# Patient Record
Sex: FEMALE | Race: WHITE | NOT HISPANIC OR LATINO | Employment: FULL TIME | URBAN - METROPOLITAN AREA
[De-identification: names, ages, dates, MRNs, and addresses within clinical notes are randomized per-mention and may not be internally consistent; named-entity substitution may affect disease eponyms.]

---

## 2017-07-01 ENCOUNTER — TRANSFERRED RECORDS (OUTPATIENT)
Dept: HEALTH INFORMATION MANAGEMENT | Facility: CLINIC | Age: 47
End: 2017-07-01

## 2017-07-28 ENCOUNTER — OFFICE VISIT (OUTPATIENT)
Dept: FAMILY MEDICINE | Facility: CLINIC | Age: 47
End: 2017-07-28
Payer: COMMERCIAL

## 2017-07-28 VITALS
DIASTOLIC BLOOD PRESSURE: 70 MMHG | OXYGEN SATURATION: 100 % | HEIGHT: 68 IN | SYSTOLIC BLOOD PRESSURE: 108 MMHG | HEART RATE: 80 BPM | BODY MASS INDEX: 21.22 KG/M2 | WEIGHT: 140 LBS

## 2017-07-28 DIAGNOSIS — R10.11 ABDOMINAL PAIN, RIGHT UPPER QUADRANT: Primary | ICD-10-CM

## 2017-07-28 DIAGNOSIS — R31.29 MICROSCOPIC HEMATURIA: ICD-10-CM

## 2017-07-28 LAB
ALBUMIN UR-MCNC: NEGATIVE MG/DL
AMORPH CRY #/AREA URNS HPF: ABNORMAL /HPF
APPEARANCE UR: CLEAR
BASOPHILS # BLD AUTO: 0 10E9/L (ref 0–0.2)
BASOPHILS NFR BLD AUTO: 0.3 %
BILIRUB UR QL STRIP: NEGATIVE
COLOR UR AUTO: YELLOW
CRP SERPL-MCNC: <2.9 MG/L (ref 0–8)
DIFFERENTIAL METHOD BLD: NORMAL
EOSINOPHIL # BLD AUTO: 0 10E9/L (ref 0–0.7)
EOSINOPHIL NFR BLD AUTO: 0.4 %
ERYTHROCYTE [DISTWIDTH] IN BLOOD BY AUTOMATED COUNT: 12.6 % (ref 10–15)
ERYTHROCYTE [SEDIMENTATION RATE] IN BLOOD BY WESTERGREN METHOD: 9 MM/H (ref 0–20)
GLUCOSE UR STRIP-MCNC: NEGATIVE MG/DL
HCT VFR BLD AUTO: 39.1 % (ref 35–47)
HGB BLD-MCNC: 13.2 G/DL (ref 11.7–15.7)
HGB UR QL STRIP: ABNORMAL
KETONES UR STRIP-MCNC: ABNORMAL MG/DL
LEUKOCYTE ESTERASE UR QL STRIP: NEGATIVE
LYMPHOCYTES # BLD AUTO: 1.3 10E9/L (ref 0.8–5.3)
LYMPHOCYTES NFR BLD AUTO: 18.5 %
MCH RBC QN AUTO: 29.2 PG (ref 26.5–33)
MCHC RBC AUTO-ENTMCNC: 33.8 G/DL (ref 31.5–36.5)
MCV RBC AUTO: 87 FL (ref 78–100)
MONOCYTES # BLD AUTO: 0.5 10E9/L (ref 0–1.3)
MONOCYTES NFR BLD AUTO: 6.4 %
NEUTROPHILS # BLD AUTO: 5.2 10E9/L (ref 1.6–8.3)
NEUTROPHILS NFR BLD AUTO: 74.4 %
NITRATE UR QL: NEGATIVE
PH UR STRIP: 5.5 PH (ref 5–7)
PLATELET # BLD AUTO: 169 10E9/L (ref 150–450)
RBC # BLD AUTO: 4.52 10E12/L (ref 3.8–5.2)
RBC #/AREA URNS AUTO: ABNORMAL /HPF (ref 0–2)
SP GR UR STRIP: 1.01 (ref 1–1.03)
URN SPEC COLLECT METH UR: ABNORMAL
UROBILINOGEN UR STRIP-ACNC: 0.2 EU/DL (ref 0.2–1)
WBC # BLD AUTO: 7 10E9/L (ref 4–11)
WBC #/AREA URNS AUTO: ABNORMAL /HPF (ref 0–2)

## 2017-07-28 PROCEDURE — 85652 RBC SED RATE AUTOMATED: CPT | Performed by: NURSE PRACTITIONER

## 2017-07-28 PROCEDURE — 36415 COLL VENOUS BLD VENIPUNCTURE: CPT | Performed by: NURSE PRACTITIONER

## 2017-07-28 PROCEDURE — 99214 OFFICE O/P EST MOD 30 MIN: CPT | Performed by: NURSE PRACTITIONER

## 2017-07-28 PROCEDURE — 85025 COMPLETE CBC W/AUTO DIFF WBC: CPT | Performed by: NURSE PRACTITIONER

## 2017-07-28 PROCEDURE — 80053 COMPREHEN METABOLIC PANEL: CPT | Performed by: NURSE PRACTITIONER

## 2017-07-28 PROCEDURE — 86140 C-REACTIVE PROTEIN: CPT | Performed by: NURSE PRACTITIONER

## 2017-07-28 PROCEDURE — 81001 URINALYSIS AUTO W/SCOPE: CPT | Performed by: NURSE PRACTITIONER

## 2017-07-28 NOTE — PATIENT INSTRUCTIONS
1.  Unclear cause of pain.  Doesn't fit with gallbladder, kidney stones, stomach ulcer, acid reflux, pneumonia, strain of abdominal muscle.  Location doesn't fit with ovarian cyst    2.  Labs today for blood counts, kidney/liver function, and inflammatory markers.  Recheck urine today, if blood in it still would recommend urology follow up     3.  Try over the counter omeprazole 20mg once a day for 2 weeks, take 30min before breakfast.  If stomach inflammation or ulcer would expect improvement within 2 weeks    4.  if not improving in 2 weeks recommend ultrasound and chest xray, mychart me

## 2017-07-28 NOTE — NURSING NOTE
"Chief Complaint   Patient presents with     Abdominal Pain       Initial /70 (BP Location: Right arm, Patient Position: Chair, Cuff Size: Adult Regular)  Pulse 80  Ht 5' 7.5\" (1.715 m)  Wt 140 lb (63.5 kg)  SpO2 100%  BMI 21.6 kg/m2 Estimated body mass index is 21.6 kg/(m^2) as calculated from the following:    Height as of this encounter: 5' 7.5\" (1.715 m).    Weight as of this encounter: 140 lb (63.5 kg).  Medication Reconciliation: complete     Katie Chamberlain MA      "

## 2017-07-28 NOTE — MR AVS SNAPSHOT
After Visit Summary   7/28/2017    Palmira Caraballo    MRN: [id number]           Patient Information     Date Of Birth          1970        Visit Information        Provider Department      7/28/2017 2:00 PM Yanna Small APRN CNP [facility]        Today's Diagnoses     Microscopic hematuria    -  1    Abdominal pain, right upper quadrant          Care Instructions    1.  Unclear cause of pain.  Doesn't fit with gallbladder, kidney stones, stomach ulcer, acid reflux, pneumonia, strain of abdominal muscle.  Location doesn't fit with ovarian cyst    2.  Labs today for blood counts, kidney/liver function, and inflammatory markers.  Recheck urine today, if blood in it still would recommend urology follow up     3.  Try over the counter omeprazole 20mg once a day for 2 weeks, take 30min before breakfast.  If stomach inflammation or ulcer would expect improvement within 2 weeks    4.  if not improving in 2 weeks recommend ultrasound and chest xray, mychart me           Follow-ups after your visit        Who to contact     If you have questions or need follow up information about today's clinic visit or your schedule please contact [facility] directly at [phone].  Normal or non-critical lab and imaging results will be communicated to you by MyChart, letter or phone within 4 business days after the clinic has received the results. If you do not hear from us within 7 days, please contact the clinic through [employer] or phone. If you have a critical or abnormal lab result, we will notify you by phone as soon as possible.  Submit refill requests through [employer] or call your pharmacy and they will forward the refill request to us. Please allow 3 business days for your refill to be completed.          Additional Information About Your Visit        MyChart Information     [employer] lets you send messages to your doctor, view your test results, renew your prescriptions,  "schedule appointments and more. To sign up, go to [email or web address] . Click on \"Log in\" on the left side of the screen, which will take you to the Welcome page. Then click on \"Sign up Now\" on the right side of the page.     You will be asked to enter the access code listed below, as well as some personal information. Please follow the directions to create your username and password.     Your access code is: [email or web address]  Expires: 10/26/2017  2:22 PM     Your access code will  in 90 days. If you need help or a new code, please call your [facility] clinic or [phone].        Care EveryWhere ID     This is your Care EveryWhere ID. This could be used by other organizations to access your [facility] medical records  [id number]        Your Vitals Were     Pulse Height Pulse Oximetry BMI (Body Mass Index)          80 5' 7.5\" (1.715 m) 100% 21.6 kg/m2         Blood Pressure from Last 3 Encounters:   17 108/70   07/24/15 110/66   07/17/15 116/60    Weight from Last 3 Encounters:   17 140 lb (63.5 kg)   07/24/15 143 lb 6.4 oz (65 kg)   07/17/15 140 lb 3.2 oz (63.6 kg)              We Performed the Following     CBC with platelets and differential     Comprehensive metabolic panel     CRP, inflammation     ESR: Erythrocyte sedimentation rate     UA reflex to Microscopic and Culture        Primary Care Provider Office Phone # Fax #    Tnljz Tim Cruz [phone]       XXX RESIGNED  [address] [place] [id number]        Equal Access to Services     [facility] : Hadfawn Mcgovern, janine scruggs, sam goldberg. So [facility] [phone].    ATENCIÓN: Si habla español, tiene a marquez disposición servicios gratuitos de asistencia lingüística. Mary al [phone].    We comply with applicable federal civil rights laws and [place] laws. We do not discriminate on the basis of race, color, national origin, age, " disability sex, sexual orientation or gender identity.            Thank you!     Thank you for choosing [facility]  for your care. Our goal is always to provide you with excellent care. Hearing back from our patients is one way we can continue to improve our services. Please take a few minutes to complete the written survey that you may receive in the mail after your visit with us. Thank you!             Your Updated Medication List - Protect others around you: Learn how to safely use, store and throw away your medicines at www.disposemymeds.org.          This list is accurate as of: 7/28/17  2:22 PM.  Always use your most recent med list.                   Brand Name Dispense Instructions for use Diagnosis    MIRENA (52 MG) 20 MCG/24HR IUD   Generic drug:  levonorgestrel      1 each by Intrauterine route once

## 2017-07-28 NOTE — PROGRESS NOTES
SUBJECTIVE:                                                    Palmira Caraballo is a 47 year old female who presents to clinic today for the following health issues:    Abdominal Pain      Duration: x 1 week    Description (location/character/radiation): right sided upper abdominal pain. Feels like a running cramp- constant pain       Associated flank pain: None    Intensity:  moderate    Accompanying signs and symptoms:        Fever/Chills: no        Gas/Bloating: no        Nausea/vomitting: no        Diarrhea: no        Dysuria or Hematuria: no     History (previous similar pain/trauma/previous testing): none    Precipitating or alleviating factors:       Pain worse with eating/BM/urination: no       Pain relieved by BM: no     Therapies tried and outcome: None    LMP:  Not regular because of IUD    Would like to get urinalysis done today as well.    RUQ cramping pain persistent x 1 week.  Not worse with eating, maybe worse with laying down.  No nausea or change in appetite.  No change in bowel habits. No constipation, diarrhea, or blood in stool.  No fever, fatigue, body aches.  No dysuria.  IUD in place.  No heartburn or bloating.  No chest pain, coughing, or shortness of breath.  Deep breathing does not trigger pain.  Rating at 2-3/10.      No hx abd surgery.      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  Microscopic hematuria x 2.    Under increased stress at work.      Patient Active Problem List   Diagnosis     NO ACTIVE PROBLEMS     CARDIOVASCULAR SCREENING; LDL GOAL LESS THAN 160     Encounter for IUD insertion     Past Surgical History:   Procedure Laterality Date     NO HISTORY OF SURGERY         Social History   Substance Use Topics     Smoking status: Never Smoker     Smokeless tobacco: Never Used     Alcohol use No     Family History   Problem Relation Age of Onset     CEREBROVASCULAR DISEASE Mother      Hypertension Mother      Breast Cancer Maternal Grandmother          Current Outpatient Prescriptions  "  Medication Sig Dispense Refill     levonorgestrel (MIRENA) 20 MCG/24HR IUD 1 each by Intrauterine route once         ROS:  Const, Resp, , GI, Psych as above, otherwise negative       OBJECTIVE:                                                    /70 (BP Location: Right arm, Patient Position: Chair, Cuff Size: Adult Regular)  Pulse 80  Ht 5' 7.5\" (1.715 m)  Wt 140 lb (63.5 kg)  SpO2 100%  BMI 21.6 kg/m2   GENERAL APPEARANCE: healthy, alert and no distress  EYES: Eyes grossly normal to inspection and conjunctivae and sclerae normal  RESP: lungs clear to auscultation - no rales, rhonchi or wheezes  ABDOMEN: soft, nontender, without hepatosplenomegaly or masses and bowel sounds normal.  Neg blackwell sign.  Neg CVA tenderness.  PSYCH: mentation appears normal and affect normal/bright     Diagnostic test results:  Diagnostic Test Results:  Results for orders placed or performed in visit on 07/28/17 (from the past 24 hour(s))   ESR: Erythrocyte sedimentation rate   Result Value Ref Range    Sed Rate 9 0 - 20 mm/h   CBC with platelets and differential   Result Value Ref Range    WBC 7.0 4.0 - 11.0 10e9/L    RBC Count 4.52 3.8 - 5.2 10e12/L    Hemoglobin 13.2 11.7 - 15.7 g/dL    Hematocrit 39.1 35.0 - 47.0 %    MCV 87 78 - 100 fl    MCH 29.2 26.5 - 33.0 pg    MCHC 33.8 31.5 - 36.5 g/dL    RDW 12.6 10.0 - 15.0 %    Platelet Count 169 150 - 450 10e9/L    Diff Method Automated Method     % Neutrophils 74.4 %    % Lymphocytes 18.5 %    % Monocytes 6.4 %    % Eosinophils 0.4 %    % Basophils 0.3 %    Absolute Neutrophil 5.2 1.6 - 8.3 10e9/L    Absolute Lymphocytes 1.3 0.8 - 5.3 10e9/L    Absolute Monocytes 0.5 0.0 - 1.3 10e9/L    Absolute Eosinophils 0.0 0.0 - 0.7 10e9/L    Absolute Basophils 0.0 0.0 - 0.2 10e9/L   UA reflex to Microscopic and Culture   Result Value Ref Range    Color Urine Yellow     Appearance Urine Clear     Glucose Urine Negative NEG mg/dL    Bilirubin Urine Negative NEG    Ketones Urine Trace (A) " NEG mg/dL    Specific Gravity Urine 1.015 1.003 - 1.035    Blood Urine Small (A) NEG    pH Urine 5.5 5.0 - 7.0 pH    Protein Albumin Urine Negative NEG mg/dL    Urobilinogen Urine 0.2 0.2 - 1.0 EU/dL    Nitrite Urine Negative NEG    Leukocyte Esterase Urine Negative NEG    Source Midstream Urine    Urine Microscopic   Result Value Ref Range    WBC Urine O - 2 0 - 2 /HPF    RBC Urine 2-5 (A) 0 - 2 /HPF    Amorphous Crystals Few (A) NEG /HPF          ASSESSMENT/PLAN:                                                    (R10.11) Abdominal pain, right upper quadrant  (primary encounter diagnosis)  Comment: broad differential.  Pain pattern not consistent with bilary colic.  No colicky character to suggest renal stones.  Not positional, doesn't seem muscular.  No resp symptoms concerning for pneumonia.  Benign abd exam, not consistent with appendicitis. Location not consistent with adnexal etiology.  IUD in, pregnancy unlikely   Plan: Comprehensive metabolic panel, ESR: Erythrocyte        sedimentation rate, CRP, inflammation, CBC with        platelets and differential        Baseline labs today.  2 week trial of PPI for gastritis/PUD but discussed symptoms dont quite fit.  Reviewed differential with pt.  Recommend follow up CXR and abd US if not improving within 2 weeks.  If fever or sever pain recommend ER eval.    (R31.29) Microscopic hematuria  Comment: hx x 2 at [facility]  Plan: UA reflex to Microscopic and Culture        Ongoing today, recommend follow up with urology, pt agrees        See Patient Instructions    Yanna Small, CNP  [facility]    Patient Instructions   1.  Unclear cause of pain.  Doesn't fit with gallbladder, kidney stones, stomach ulcer, acid reflux, pneumonia, strain of abdominal muscle.  Location doesn't fit with ovarian cyst    2.  Labs today for blood counts, kidney/liver function, and inflammatory markers.  Recheck urine today, if blood in it still would recommend urology follow up      3.  Try over the counter omeprazole 20mg once a day for 2 weeks, take 30min before breakfast.  If stomach inflammation or ulcer would expect improvement within 2 weeks    4.  if not improving in 2 weeks recommend ultrasound and chest xray, mychart me

## 2017-07-29 LAB
ALBUMIN SERPL-MCNC: 4 G/DL (ref 3.4–5)
ALP SERPL-CCNC: 47 U/L (ref 40–150)
ALT SERPL W P-5'-P-CCNC: 16 U/L (ref 0–50)
ANION GAP SERPL CALCULATED.3IONS-SCNC: 7 MMOL/L (ref 3–14)
AST SERPL W P-5'-P-CCNC: 14 U/L (ref 0–45)
BILIRUB SERPL-MCNC: 0.4 MG/DL (ref 0.2–1.3)
BUN SERPL-MCNC: 11 MG/DL (ref 7–30)
CALCIUM SERPL-MCNC: 9 MG/DL (ref 8.5–10.1)
CHLORIDE SERPL-SCNC: 105 MMOL/L (ref 94–109)
CO2 SERPL-SCNC: 26 MMOL/L (ref 20–32)
CREAT SERPL-MCNC: 0.7 MG/DL (ref 0.52–1.04)
GFR SERPL CREATININE-BSD FRML MDRD: NORMAL ML/MIN/1.7M2
GLUCOSE SERPL-MCNC: 85 MG/DL (ref 70–99)
POTASSIUM SERPL-SCNC: 3.9 MMOL/L (ref 3.4–5.3)
PROT SERPL-MCNC: 7.6 G/DL (ref 6.8–8.8)
SODIUM SERPL-SCNC: 138 MMOL/L (ref 133–144)

## 2017-07-31 ENCOUNTER — PRE VISIT (OUTPATIENT)
Dept: UROLOGY | Facility: CLINIC | Age: 47
End: 2017-07-31

## 2017-07-31 NOTE — PROGRESS NOTES
Palmira,    Normal white count, liver function, kidney function, blood sugar, and inflammatory markers.  All reassuring.    Yanna Small, CNP

## 2018-04-03 ENCOUNTER — TRANSFERRED RECORDS (OUTPATIENT)
Dept: HEALTH INFORMATION MANAGEMENT | Facility: CLINIC | Age: 48
End: 2018-04-03

## 2018-07-13 ENCOUNTER — OFFICE VISIT (OUTPATIENT)
Dept: OBGYN | Facility: CLINIC | Age: 48
End: 2018-07-13
Payer: COMMERCIAL

## 2018-07-13 VITALS
SYSTOLIC BLOOD PRESSURE: 122 MMHG | BODY MASS INDEX: 23.78 KG/M2 | HEIGHT: 66 IN | WEIGHT: 148 LBS | DIASTOLIC BLOOD PRESSURE: 68 MMHG

## 2018-07-13 DIAGNOSIS — Z01.419 WELL WOMAN EXAM WITH ROUTINE GYNECOLOGICAL EXAM: Primary | ICD-10-CM

## 2018-07-13 DIAGNOSIS — N83.202 CYST OF LEFT OVARY: ICD-10-CM

## 2018-07-13 DIAGNOSIS — L90.0 LICHEN SCLEROSUS ET ATROPHICUS: ICD-10-CM

## 2018-07-13 PROCEDURE — 87624 HPV HI-RISK TYP POOLED RSLT: CPT | Performed by: OBSTETRICS & GYNECOLOGY

## 2018-07-13 PROCEDURE — G0145 SCR C/V CYTO,THINLAYER,RESCR: HCPCS | Performed by: OBSTETRICS & GYNECOLOGY

## 2018-07-13 PROCEDURE — 99396 PREV VISIT EST AGE 40-64: CPT | Performed by: OBSTETRICS & GYNECOLOGY

## 2018-07-13 NOTE — PROGRESS NOTES
Palmira is a 48 year old  female who presents for annual exam.     Menses are rare  No LMP recorded. Patient is not currently having periods (Reason: IUD).. Using IUD for contraception.  She is not currently considering pregnancy.  Besides routine health maintenance,  she would like to discuss findings at [facility] executive physical, in summer 2017.  She had clitoral itching, Raman found lichen sclerosis et atrophicus; treated with clobetasol with resolution of symptoms.    They found microscopic hematuria (x 3).  Urology w/u, cytology, imaging.  No source found.  Has had Mirena IUD (3rd one) this one since , no issues.  Feels well.   GYNECOLOGIC HISTORY:  Menarche: 18  Palmira is sexually active with 1 male partner(s) and is currently in monogamous relationship with .    History sexually transmitted infections:No STD history  STI testing offered?  Declined  FELIX exposure: Unknown  History of abnormal Pap smear: NO - age 30- 65 PAP every 3 years recommended  Family history of breast CA: Yes (Please explain): MGM  Family history of uterine/ovarian CA: No    Family history of colon CA: No    HEALTH MAINTENANCE:  Cholesterol: (  Cholesterol   Date Value Ref Range Status   2010 142 0 - 200 mg/dL Final     Comment:     LDL Cholesterol is the primary guide to therapy.   The NCEP recommends further evaluation of: patients with cholesterol <200   mg/dL   if additional risk factors are present, cholesterol >240 mg/dL, triglycerides   >150 mg/dL, or HDL <40 mg/dL.   2006 144 0 - 200 mg/dL Final     Comment:     LDL Cholesterol is the primary guide to therapy: LDL-cholesterol goal in high   risk patients is <100 mg/dL and in very high risk patients is <70 mg/dL.   The NCEP recommends further evaluation of: patients with cholesterol <200 mg/dL   if additional risk factors are present, cholesterol >240 mg/dL, triglycerides   >150 mg/dL, or HDL <40 mg/dL.    History of abnormal lipids: No    Mammo:  yes  . History of abnormal Mammo: No.  Regular Self Breast Exams: Yes    Current or Past (Physical, Sexual or Emotional):  No  Do you feel safe in your environment:  Yes    Calcium/Vitamin D intake: source:  dairy Adequate? Yes   Last TDAP? 2009 Offered today? Yes    TSH: (No results found for: TSH )  Pap; (  Lab Results   Component Value Date    PAP NIL 2015    PAP NIL 2012    PAP NIL 2011    )    HISTORY:  Obstetric History       T3      L3     SAB0   TAB0   Ectopic0   Multiple0   Live Births3       # Outcome Date GA Lbr Franc/2nd Weight Sex Delivery Anes PTL Lv   6  05 40w0d  7 lb 2 oz (3.232 kg) F    JOSEFA      Name: Josefa   5  10/11/02 40w0d  8 lb 1.6 oz (3.674 kg) F    JOSEFA      Name: Mindy   4  00 40w0d  6 lb 1.8 oz (2.771 kg) F    JOSEFA      Name: Prudence   3 Term            2 Term            1 Term                 Past Medical History:   Diagnosis Date     NO ACTIVE PROBLEMS      Past Surgical History:   Procedure Laterality Date     NO HISTORY OF SURGERY       Family History   Problem Relation Age of Onset     Cerebrovascular Disease Mother      Hypertension Mother      Breast Cancer Maternal Grandmother      Social History     Social History     Marital status:      Spouse name: N/A     Number of children: N/A     Years of education: N/A     Social History Main Topics     Smoking status: Never Smoker     Smokeless tobacco: Never Used     Alcohol use No     Drug use: No     Sexual activity: Yes     Partners: Male     Birth control/ protection: IUD      Comment: Mirena     Other Topics Concern     None     Social History Narrative    Caffeine intake/servings daily - 0    Calcium intake/servings daily - 2    Exercise 3 times weekly - describe cardio, pilates    Sunscreen used - Yes    Seatbelts used - Yes    Guns stored in the home - No    Self Breast Exam - not often    Pap test up to date -  Yes 8/12/10 NIL    Eye exam up to date -  " No    Dental exam up to date -  Yes    DEXA scan up to date -  Not Applicable    Flex Sig/Colonoscopy up to date -  Not Applicable    Mammography up to date -  No    Immunizations reviewed and up to date - Yes tdap 7/14/2009    Abuse: Current or Past (Physical, Sexual or Emotional) - No    Do you feel safe in your environment - Yes    Do you cope well with stress - Yes    Do you suffer from insomnia - No    Last updated by: Mela Todd MA.                   Current Outpatient Prescriptions:      levonorgestrel (MIRENA) 20 MCG/24HR IUD, 1 each by Intrauterine route once, Disp: , Rfl:      Allergies   Allergen Reactions     No Known Allergies        Past medical, surgical, social and family history were reviewed and updated in EPIC.    ROS:   C:     NEGATIVE for fever, chills, change in weight  I:       NEGATIVE for worrisome rashes, moles or lesions  E:     NEGATIVE for vision changes or irritation  E/M: NEGATIVE for ear, mouth and throat problems  R:     NEGATIVE for significant cough or SOB  CV:   NEGATIVE for chest pain, palpitations or peripheral edema  GI:     NEGATIVE for nausea, abdominal pain, heartburn, or change in bowel habits  :   NEGATIVE for frequency, dysuria, hematuria, vaginal discharge, or irregular bleeding  M:     NEGATIVE for significant arthralgias or myalgia  N:      NEGATIVE for weakness, dizziness or paresthesias  E:      NEGATIVE for temperature intolerance, skin/hair changes  P:      NEGATIVE for changes in mood or affect.    EXAM:  /68  Ht 5' 6\" (1.676 m)  Wt 148 lb (67.1 kg)  BMI 23.89 kg/m2   BMI: Body mass index is 23.89 kg/(m^2).  Constitutional: healthy, alert and no distress  Head: Normocephalic. No masses, lesions, tenderness or abnormalities  Neck: Neck supple. Trachea midline. No adenopathy. Thyroid symmetric, normal size.   Cardiovascular: RRR.   Respiratory: Negative.   Breast: Breasts reveal mild symmetric fibrocystic densities, but there are no dominant, " discrete, fixed or suspicious masses found.  Gastrointestinal: Abdomen soft, non-tender, non-distended. No masses, organomegaly.  :  Vulva:  No external lesions, normal female hair distribution, no inguinal adenopathy. Upon closer inspection tiny amount leukoplakia at clitoral mauro.     Urethra:  Midline, non-tender, well supported, no discharge  Vagina:  Moist, pink, no abnormal discharge, no lesions  Cervix without lesion.  IUD strings visible.   Uterus:  Normal size, anteflexed , non-tender, freely mobile  Ovaries:  No masses appreciated, non-tender, mobile  Rectal Exam: deferred  Musculoskeletal: extremities normal  Skin: no suspicious lesions or rashes  Psychiatric: Affect appropriate, cooperative,mentation appears normal.     COUNSELING:   Reviewed preventive health counseling, as reflected in patient instructions   reports that she has never smoked. She has never used smokeless tobacco.    Body mass index is 23.89 kg/(m^2).    FRAX Risk Assessment    ASSESSMENT:  48 year old female with satisfactory annual exam  (Z01.419) Well woman exam with routine gynecological exam  (primary encounter diagnosis)  Comment:   Plan: Pap imaged thin layer screen with HPV -         recommended age 30 - 65 years (select HPV order        below)            (L90.0) Lichen sclerosus et atrophicus  Comment: very minimal findings   Plan: continue symptomatic management     (N83.202) Cyst of left ovary.  Discussed that IUD doesn't prevent ovulation and simple cysts common. Benign in nature, discussed.   Comment: discussed that simple cysts under 4 cm do not require radiologic f/u   Plan: US Transvaginal Non OB        In 3 weeks

## 2018-07-13 NOTE — MR AVS SNAPSHOT
After Visit Summary   7/13/2018    Palmira Caraballo    MRN: [id number]           Patient Information     Date Of Birth          1970        Visit Information        Provider Department      7/13/2018 11:00 AM Neva Molina MD [facility]        Today's Diagnoses     Well woman exam with routine gynecological exam    -  1    Lichen sclerosus et atrophicus        Cyst of left ovary           Follow-ups after your visit        Future tests that were ordered for you today     Open Future Orders        Priority Expected Expires Ordered    US Transvaginal Non OB Routine 8/3/2018 7/13/2019 7/13/2018            Who to contact     If you have questions or need follow up information about today's clinic visit or your schedule please contact [facility] directly at [phone].  Normal or non-critical lab and imaging results will be communicated to you by MyChart, letter or phone within 4 business days after the clinic has received the results. If you do not hear from us within 7 days, please contact the clinic through MyChart or phone. If you have a critical or abnormal lab result, we will notify you by phone as soon as possible.  Submit refill requests through [employer] or call your pharmacy and they will forward the refill request to us. Please allow 3 business days for your refill to be completed.          Additional Information About Your Visit        MyChart Information     [employer] gives you secure access to your electronic health record. If you see a primary care provider, you can also send messages to your care team and make appointments. If you have questions, please call your primary care clinic.  If you do not have a primary care provider, please call [phone] and they will assist you.        Care EveryWhere ID     This is your Care EveryWhere ID. This could be used by other organizations to access your [facility] medical records  [id number]        Your  "Vitals Were     Height BMI (Body Mass Index)                5' 6\" (1.676 m) 23.89 kg/m2           Blood Pressure from Last 3 Encounters:   07/13/18 122/68   07/28/17 108/70   07/24/15 110/66    Weight from Last 3 Encounters:   07/13/18 148 lb (67.1 kg)   07/28/17 140 lb (63.5 kg)   07/24/15 143 lb 6.4 oz (65 kg)              We Performed the Following     Pap imaged thin layer screen with HPV - recommended age 30 - 65 years (select HPV order below)        Primary Care Provider    Wong Cruz MD       No address on file        Equal Access to Services     [facility]: Hadii constantine Mcgovern, janine scruggs, sekou thomsonmaeliana jean baptiste, sam otero . So [facility] [phone].    ATENCIÓN: Si habla español, tiene a marquez disposición servicios gratuitos de asistencia lingüística. Llame al [phone].    We comply with applicable federal civil rights laws and [place] laws. We do not discriminate on the basis of race, color, national origin, age, disability, sex, sexual orientation, or gender identity.            Thank you!     Thank you for choosing [facility]  for your care. Our goal is always to provide you with excellent care. Hearing back from our patients is one way we can continue to improve our services. Please take a few minutes to complete the written survey that you may receive in the mail after your visit with us. Thank you!             Your Updated Medication List - Protect others around you: Learn how to safely use, store and throw away your medicines at www.disposemymeds.org.          This list is accurate as of 7/13/18 11:40 AM.  Always use your most recent med list.                   Brand Name Dispense Instructions for use Diagnosis    MIRENA (52 MG) 20 MCG/24HR IUD   Generic drug:  levonorgestrel      1 each by Intrauterine route once          "

## 2018-07-13 NOTE — Clinical Note
Please abstract the following:  Mammogram performed at [facility] July 2017 and negative, per patient

## 2018-07-13 NOTE — NURSING NOTE
"Chief Complaint   Patient presents with     Physical       Initial /68  Ht 5' 6\" (1.676 m)  Wt 148 lb (67.1 kg)  BMI 23.89 kg/m2 Estimated body mass index is 23.89 kg/(m^2) as calculated from the following:    Height as of this encounter: 5' 6\" (1.676 m).    Weight as of this encounter: 148 lb (67.1 kg).  BP completed using cuff size: regular        The following HM Due: pap smear      The following patient reported/Care Every where data was sent to:  P ABSTRACT QUALITY INITIATIVES [[id number]]        N/a      .,clw              "

## 2018-07-17 LAB
COPATH REPORT: NORMAL
PAP: NORMAL

## 2018-07-18 LAB
FINAL DIAGNOSIS: NORMAL
HPV HR 12 DNA CVX QL NAA+PROBE: NEGATIVE
HPV16 DNA SPEC QL NAA+PROBE: NEGATIVE
HPV18 DNA SPEC QL NAA+PROBE: NEGATIVE
SPECIMEN DESCRIPTION: NORMAL
SPECIMEN SOURCE CVX/VAG CYTO: NORMAL

## 2018-08-03 ENCOUNTER — RADIANT APPOINTMENT (OUTPATIENT)
Dept: ULTRASOUND IMAGING | Facility: CLINIC | Age: 48
End: 2018-08-03
Attending: OBSTETRICS & GYNECOLOGY
Payer: COMMERCIAL

## 2018-08-03 DIAGNOSIS — N83.202 CYST OF LEFT OVARY: ICD-10-CM

## 2018-08-03 PROCEDURE — 76830 TRANSVAGINAL US NON-OB: CPT | Performed by: OBSTETRICS & GYNECOLOGY

## 2019-11-03 ENCOUNTER — HEALTH MAINTENANCE LETTER (OUTPATIENT)
Age: 49
End: 2019-11-03

## 2020-11-16 ENCOUNTER — HEALTH MAINTENANCE LETTER (OUTPATIENT)
Age: 50
End: 2020-11-16

## 2021-02-07 ENCOUNTER — HEALTH MAINTENANCE LETTER (OUTPATIENT)
Age: 51
End: 2021-02-07

## 2021-06-10 ENCOUNTER — ANCILLARY PROCEDURE (OUTPATIENT)
Dept: MAMMOGRAPHY | Facility: CLINIC | Age: 51
End: 2021-06-10
Attending: OBSTETRICS & GYNECOLOGY
Payer: COMMERCIAL

## 2021-06-10 DIAGNOSIS — Z12.31 VISIT FOR SCREENING MAMMOGRAM: ICD-10-CM

## 2021-06-10 PROCEDURE — 77067 SCR MAMMO BI INCL CAD: CPT

## 2021-06-10 PROCEDURE — 77067 SCR MAMMO BI INCL CAD: CPT | Mod: 26 | Performed by: RADIOLOGY

## 2021-06-25 ENCOUNTER — OFFICE VISIT (OUTPATIENT)
Dept: OBGYN | Facility: CLINIC | Age: 51
End: 2021-06-25
Payer: COMMERCIAL

## 2021-06-25 VITALS
SYSTOLIC BLOOD PRESSURE: 118 MMHG | WEIGHT: 144.4 LBS | BODY MASS INDEX: 22.66 KG/M2 | TEMPERATURE: 98.7 F | HEART RATE: 51 BPM | DIASTOLIC BLOOD PRESSURE: 80 MMHG | HEIGHT: 67 IN

## 2021-06-25 DIAGNOSIS — Z30.09 GENERAL COUNSELING AND ADVICE ON FEMALE CONTRACEPTION: ICD-10-CM

## 2021-06-25 DIAGNOSIS — Z00.00 ANNUAL PHYSICAL EXAM: Primary | ICD-10-CM

## 2021-06-25 DIAGNOSIS — Z00.00 HEALTHCARE MAINTENANCE: ICD-10-CM

## 2021-06-25 DIAGNOSIS — L90.0 LICHEN SCLEROSUS ET ATROPHICUS: ICD-10-CM

## 2021-06-25 LAB
CHOLEST SERPL-MCNC: 191 MG/DL
HCV AB SERPL QL IA: NONREACTIVE
HDLC SERPL-MCNC: 79 MG/DL
LDLC SERPL CALC-MCNC: 102 MG/DL
NONHDLC SERPL-MCNC: 112 MG/DL
TRIGL SERPL-MCNC: 52 MG/DL

## 2021-06-25 PROCEDURE — 80061 LIPID PANEL: CPT | Performed by: OBSTETRICS & GYNECOLOGY

## 2021-06-25 PROCEDURE — 86803 HEPATITIS C AB TEST: CPT | Performed by: OBSTETRICS & GYNECOLOGY

## 2021-06-25 PROCEDURE — 36415 COLL VENOUS BLD VENIPUNCTURE: CPT | Performed by: OBSTETRICS & GYNECOLOGY

## 2021-06-25 PROCEDURE — 99396 PREV VISIT EST AGE 40-64: CPT | Performed by: OBSTETRICS & GYNECOLOGY

## 2021-06-25 RX ORDER — CLOBETASOL PROPIONATE 0.5 MG/G
OINTMENT TOPICAL 2 TIMES DAILY
Qty: 45 G | Refills: 4 | Status: SHIPPED | OUTPATIENT
Start: 2021-06-25 | End: 2023-08-18

## 2021-06-25 ASSESSMENT — MIFFLIN-ST. JEOR: SCORE: 1304.68

## 2021-06-25 NOTE — PROGRESS NOTES
Palmira is a 51 year old  female who presents for annual exam.     Menses are rare, has and IUD and normal lasting 2 days.  Menses flow: light and spotty.  Patient's last menstrual period was 06/10/2021.. Using IUD for contraception.  She is not currently considering pregnancy.  Besides routine health maintenance,  she would like to discuss IUD.  Got Mirena right after having children.  Was on oral contractive pills prior to that.  Not sure if she should get another one or if she's fine without.  Her mom recently passed away from diagnosis of cortio basal degeneration and ataxia.  Wondering if this is genetic and if there would be an screening tests to look for that.  Diagnosis of lichen sclerosis.  Has been using topical steroid cream, but hasn't gotten a prescription in quite a while.  Running out and thinks it's likely .  GYNECOLOGIC HISTORY:  Menarche: 18  Age at first intercourse: 20 Number of lifetime partners: 1  Palmira is sexually active with male partner(s) and is currently in monogamous relationship with .    History sexually transmitted infections:No STD history  STI testing offered?  Declined  FELIX exposure: Unknown  History of abnormal Pap smear: NO - age 30- 65 PAP every 3 years recommended  Family history of breast CA: Yes (Please explain): Maternal grandmother  Family history of uterine/ovarian CA: No    Family history of colon CA: No    HEALTH MAINTENANCE:  Cholesterol: (  Cholesterol   Date Value Ref Range Status   2021 191 <200 mg/dL Final   2010 142 0 - 200 mg/dL Final     Comment:     LDL Cholesterol is the primary guide to therapy.   The NCEP recommends further evaluation of: patients with cholesterol <200   mg/dL   if additional risk factors are present, cholesterol >240 mg/dL, triglycerides   >150 mg/dL, or HDL <40 mg/dL.    History of abnormal lipids: No  Mammo: 6/10/2021 . History of abnormal Mammo: No.  Regular Self Breast Exams: Yes  Calcium/Vitamin D  intake: source:  dairy, few green leafy veggies Adequate? Yes  TSH: (No results found for: TSH )  Pap; (  Lab Results   Component Value Date    PAP NIL 2018    PAP NIL 2015    PAP NIL 2012    )    HISTORY:  OB History    Para Term  AB Living   3 3 3 0 0 3   SAB TAB Ectopic Multiple Live Births   0 0 0 0 3      # Outcome Date GA Lbr Franc/2nd Weight Sex Delivery Anes PTL Lv   3 Term 05 40w0d  3.232 kg (7 lb 2 oz) F    JOSEFA      Name: Josefa   2 Term 10/11/02 40w0d  3.674 kg (8 lb 1.6 oz) F    JOSEFA      Name: Mindy   1 Term 00 40w0d  2.771 kg (6 lb 1.8 oz) F    JOSEFA      Name: Prudence     Past Medical History:   Diagnosis Date     NO ACTIVE PROBLEMS      Past Surgical History:   Procedure Laterality Date     NO HISTORY OF SURGERY       Family History   Problem Relation Age of Onset     Cerebrovascular Disease Mother      Hypertension Mother      Other - See Comments Mother         corticobasal degeneration and ataxia     Breast Cancer Maternal Grandmother      Aneurysm Maternal Grandfather         50s     Social History     Socioeconomic History     Marital status:      Spouse name: Not on file     Number of children: Not on file     Years of education: Not on file     Highest education level: Not on file   Occupational History     Not on file   Social Needs     Financial resource strain: Not on file     Food insecurity     Worry: Not on file     Inability: Not on file     Transportation needs     Medical: Not on file     Non-medical: Not on file   Tobacco Use     Smoking status: Never Smoker     Smokeless tobacco: Never Used   Substance and Sexual Activity     Alcohol use: No     Drug use: No     Sexual activity: Yes     Partners: Male     Birth control/protection: I.U.D.     Comment: Mirena   Lifestyle     Physical activity     Days per week: Not on file     Minutes per session: Not on file     Stress: Not on file   Relationships     Social connections      Talks on phone: Not on file     Gets together: Not on file     Attends [employer] service: Not on file     Active member of club or organization: Not on file     Attends meetings of clubs or organizations: Not on file     Relationship status: Not on file     Intimate partner violence     Fear of current or ex partner: Not on file     Emotionally abused: Not on file     Physically abused: Not on file     Forced sexual activity: Not on file   Other Topics Concern     Parent/sibling w/ CABG, MI or angioplasty before 65F 55M? Not Asked   Social History Narrative    Caffeine intake/servings daily - 0    Calcium intake/servings daily - 2    Exercise 3 times weekly - describe cardio, pilates    Sunscreen used - Yes    Seatbelts used - Yes    Guns stored in the home - No    Self Breast Exam - not often    Pap test up to date -  Yes 8/12/10 NIL    Eye exam up to date -  No    Dental exam up to date -  Yes    DEXA scan up to date -  Not Applicable    Flex Sig/Colonoscopy up to date -  Not Applicable    Mammography up to date -  No    Immunizations reviewed and up to date - Yes tdap 7/14/2009    Abuse: Current or Past (Physical, Sexual or Emotional) - No    Do you feel safe in your environment - Yes    Do you cope well with stress - Yes    Do you suffer from insomnia - No    Last updated by: Mela Todd MA.                   Current Outpatient Medications:      clobetasol (TEMOVATE) 0.05 % external ointment, Apply topically 2 times daily as need for itching.  Notify provider if symptoms don't resolve within 2 weeks., Disp: 45 g, Rfl: 4     levonorgestrel (MIRENA) 20 MCG/24HR IUD, 1 each by Intrauterine route once, Disp: , Rfl:      Allergies   Allergen Reactions     No Known Allergies        Past medical, surgical, social and family history were reviewed and updated in EPIC.    ROS:   C:     NEGATIVE for fever, chills, change in weight  I:       NEGATIVE for worrisome rashes, moles or lesions  E:     NEGATIVE for vision  "changes or irritation  E/M: NEGATIVE for ear, mouth and throat problems  R:     NEGATIVE for significant cough or SOB  CV:   NEGATIVE for chest pain, palpitations or peripheral edema  GI:     NEGATIVE for nausea, abdominal pain, heartburn, or change in bowel habits  :   NEGATIVE for frequency, dysuria, hematuria, vaginal discharge, or irregular bleeding  M:     NEGATIVE for significant arthralgias or myalgia  N:      NEGATIVE for weakness, dizziness or paresthesias  E:      NEGATIVE for temperature intolerance, skin/hair changes  P:      NEGATIVE for changes in mood or affect.    EXAM:  /80 (BP Location: Left arm, Patient Position: Sitting, Cuff Size: Adult Regular)   Pulse 51   Temp 98.7  F (37.1  C) (Temporal)   Ht 1.689 m (5' 6.5\")   Wt 65.5 kg (144 lb 6.4 oz)   LMP 06/10/2021   Breastfeeding No   BMI 22.96 kg/m     BMI: Body mass index is 22.96 kg/m .  Constitutional: healthy, alert and no distress  Head: Normocephalic. No masses, lesions, tenderness or abnormalities  Neck: Neck supple. Trachea midline. No adenopathy. Thyroid symmetric, normal size.   Cardiovascular: RRR.   Respiratory: Negative.   Breast: Breasts reveal mild symmetric fibrocystic densities, but there are no dominant, discrete, fixed or suspicious masses found.  Gastrointestinal: Abdomen soft, non-tender, non-distended. No masses, organomegaly.  :  Vulva:  No external lesions, normal female hair distribution, no inguinal adenopathy.    Urethra:  Midline, non-tender, well supported, no discharge  Vagina:  Moist, pink, no abnormal discharge, no lesions  Uterus:  Normal size, non-tender, freely mobile  Ovaries:  No masses appreciated, non-tender, mobile  Rectal Exam: deferred  Musculoskeletal: extremities normal  Skin: no suspicious lesions or rashes  Psychiatric: Affect appropriate, cooperative,mentation appears normal.     COUNSELING:   Reviewed preventive health counseling, as reflected in patient instructions       " Contraception       Consider Hep C screening for all patients one time for ages 18-79 years       HIV screeninx in teen years, 1x in adult years, and at intervals if high risk       (Ernestina)menopause management   reports that she has never smoked. She has never used smokeless tobacco.    Body mass index is 22.96 kg/m .    FRAX Risk Assessment    ASSESSMENT:  51 year old female with satisfactory annual exam  (Z00.00) Annual physical exam  (primary encounter diagnosis)  Comment: Normal exam.  Mammogram UTD    (Z00.00) Healthcare maintenance  Comment:   Plan: Lipid Profile, Hepatitis C antibody            (L90.0) Lichen sclerosus et atrophicus  Comment: Given refill.  Knows to take when she has flares.  Discussed if she uses the ointment for 2 weeks and she's still symptomatic, should return to clinic  Plan: clobetasol (TEMOVATE) 0.05 % external ointment           (Z30.09) General counseling and advice on female contraception  Comment: Has had Mirena since .  Discussed new FDA recommendation that it can be left for up to 6 years, but she's met that deadline.  Not having an menopausal symptoms at this point.  Discussed pros and cons of replacing it vs taking it out and leaving it out.  Ultimately decided to have it replaced, then remove it when it's done in 5-6 years.  Should be post-menopausal by then    RTC for IUD placement.    Eva Milian MD

## 2021-07-09 NOTE — PATIENT INSTRUCTIONS
Patient Education     Prevention Guidelines, Women Ages 40 to 49  Screening tests and vaccines are an important part of managing your health. A screening test is done to find diseases in people who don't have any symptoms. The goal is to find a disease early so lifestyle changes and checkups can reduce the risk of disease. Or the goal may be to detect it early to treat it most effectively. Screening tests are not used to diagnose a disease. But they are used to see if more testing is needed. Health counseling is important, too. Below are guidelines for these, for women ages 40 to 49. Talk with your healthcare provider to make sure you re up-to-date on what you need.  Screening Who needs it How often   Type 2 diabetes or prediabetes All women beginning at age 45 and women without symptoms at any age who are overweight or obese and have 1 or more additional risk factors for diabetes At least every 3 years1   Type 2 diabetes or prediabetes All women diagnosed with gestational diabetes Lifelong testing every 3 years   Type 2 diabetes All women with prediabetes Every year   Alcohol misuse All women in this age group At routine exams   Blood pressure All women in this age group Yearly checkup if your blood pressure is normal  Normal blood pressure is less than 120/80 mm Hg  If your blood pressure reading is higher than normal, follow the advice of your healthcare provider   Breast cancer All women at average risk in this age group Screening with a mammogram can start at age 40.2 Talk with your healthcare provider to help you decide when to start screening. At age 45 start yearly mammograms.3   Cervical cancer All women in this age group, except women who have had a complete hysterectomy Pap test every 3 years or Pap test plus human papilloma virus (HPV) test every 5 years   Colorectal cancer Women age 45 years and older at average risk Multiple tests are available and are used at different times. Possible tests  include:    Flexible sigmoidoscopy every 5 years, or    Colonoscopy every 10 years, or    CT colonography (virtual colonoscopy) every 5 years, or    Yearly fecal occult blood test, or    Yearly fecal immunochemical test every year, or    Stool DNA test, every 3 years  If you choose a test other than a colonoscopy and have an abnormal test result, you will need to follow-up with a colonoscopy. Screening advice varies among expert groups. Talk with your healthcare provider about which tests are best for you.  Some people should be screened using a different schedule because of their personal or family health history. Talk with your healthcare provider about your health history.   Chlamydia Women at increased risk for infection At routine exams if you're at risk or have symptoms   Depression All women in this age group At routine exams   Gonorrhea Sexually active women at increased risk for infection At routine exams   Hepatitis C Anyone at increased risk; 1 time for those born between 1945 and 1965 At routine exams   High cholesterol or triglycerides All women ages 45 and older who are at risk for coronary artery disease; younger women, talk with your healthcare provider At least every 5 years   HIV All women At routine exams. Those with risk factors for HIV should be tested at least annually.   Obesity All women in this age group At routine exams   Syphilis Women at increased risk for infection-talk with your healthcare provider At routine exams   Tuberculosis Women at increased risk for infection-talk with your healthcare provider Ask your healthcare provider   Vision All women in this age group Complete exam at age 40 and eye exams every 2 to 4 years. If you have a chronic disease, ask your healthcare provider how often you should have your eyes examined.4   Vaccine Who needs it How often   Chickenpox (varicella) All women in this age group who have no record of this infection or vaccine 2 doses; the second dose  should be given at least 4 weeks after the first dose   Hepatitis A Women at increased risk for infection-talk with your healthcare provider 2 doses given 6 months apart   Hepatitis B Women at increased risk for infection-talk with your healthcare provider 3 doses over 6 months; second dose should be given 1 month after the first dose; the third dose should be given at least 2 months after the second dose and at least 4 months after the first dose   Haemophilus influenzae Type B (HIB) Women at increased risk 1 to 3 doses   Influenza (flu) All women in this age group Once a year   Measles, mumps, rubella (MMR) All women in this age group who have no record of these infections or vaccines 1 or 2 doses   Meningococcal Women at increased risk for infection-talk with your healthcare provider 1 or more doses   Pneumococcal conjugate vaccine (PCV13) and pneumococcal polysaccharide vaccine (PPSV23) Women at increased risk for infection-talk with your healthcare provider 1 or 2 doses   Tetanus/diphtheria/pertussis (Td/Tdap) booster All women in this age group A 1-time dose of Tdap instead of a Td booster after age 18, then Td every 10 years   Counseling Who needs it How often   BRCA gene mutation testing for breast and ovarian cancer susceptibility Women with increased risk for having gene mutation When your risk is known   Breast cancer and chemoprevention Women at high risk for breast cancer When your risk is known   Diet and exercise Women who are overweight or obese When diagnosed, and then at routine exams   Domestic violence Women at the age in which they are able to have children At routine exams   Sexually transmitted infection prevention Women at increased risk for infection-talk with your healthcare provider At routine exams   Use of tobacco and the health effects it can cause All women in this age group Every exam   1 American Diabetes Association  2 American College of Obstetricians and Gynecologists   3 American  Cancer Society  4 American Academy of Ophthalmology  [place] last reviewed this educational content on 11/1/2017 2000-2021 [employer]. All rights reserved. This information is not intended as a substitute for professional medical care. Always follow your healthcare professional's instructions.

## 2021-07-22 NOTE — PROGRESS NOTES
PROCEDURE: IUD removal and replacement.    Patient has verbalized understanding of risks and benefits. She was counseled on risks of infection, bleeding, uterine perforation, cervical laceration, expulsion, overall risk of pregnancy 2 in 1000, if she does get pregnant that there is an increased risk of ectopic pregnancy. All questions answered. She has signed the consent form.      A regular Lupe speculum was placed in the vagina with good visualization of the cervix.  The IUD strings were visualized at cervical os.  IUD strings grasped with sterile ring forceps. Gentle traction applied and IUD removed intact without difficulty. The cervix was then swabbed with a betadine x3.  Tenaculum was placed at the 12 o'clock position on the cervix and the uterus sounded to 8cm.  The Mirena  IUD was then placed in the usual fashion under sterile technique without difficulty.  Strings were clipped about 2-3 cm from the cervical os.  Tenaculum was removed and silver nitrate was used to achieve hemostasis at tenaculum puncture sites. There were no complications. The patient tolerated the procedure well.    Bleeding pattern of this particular IUD was discussed with the patient. She is aware that the IUD will need to be removed in 6 years or PRN.  She is to return to clinic for her next annual or PRN.        Eva Milian MD

## 2021-07-23 ENCOUNTER — OFFICE VISIT (OUTPATIENT)
Dept: OBGYN | Facility: CLINIC | Age: 51
End: 2021-07-23
Payer: COMMERCIAL

## 2021-07-23 VITALS
HEART RATE: 96 BPM | DIASTOLIC BLOOD PRESSURE: 75 MMHG | WEIGHT: 146 LBS | BODY MASS INDEX: 23.21 KG/M2 | TEMPERATURE: 99.3 F | SYSTOLIC BLOOD PRESSURE: 118 MMHG

## 2021-07-23 DIAGNOSIS — Z30.433 ENCOUNTER FOR REMOVAL AND REINSERTION OF INTRAUTERINE CONTRACEPTIVE DEVICE: Primary | ICD-10-CM

## 2021-07-23 PROCEDURE — 58301 REMOVE INTRAUTERINE DEVICE: CPT | Performed by: OBSTETRICS & GYNECOLOGY

## 2021-07-23 PROCEDURE — 58300 INSERT INTRAUTERINE DEVICE: CPT | Performed by: OBSTETRICS & GYNECOLOGY

## 2021-09-18 ENCOUNTER — HEALTH MAINTENANCE LETTER (OUTPATIENT)
Age: 51
End: 2021-09-18

## 2021-09-28 ENCOUNTER — MYC MEDICAL ADVICE (OUTPATIENT)
Dept: OBGYN | Facility: CLINIC | Age: 51
End: 2021-09-28

## 2021-09-28 NOTE — TELEPHONE ENCOUNTER
Pt (mother) is inquiring about her daughter who would like to get IUD and has scheduled appt with Dr Julio at [facility]. Mom is wondering if a phone consult can be done prior to the appointment so the insertion can happen at the appointment. Routing to BE since pt scheduled with you.   Sofy Maldonado, PJ-BSN

## 2021-09-28 NOTE — TELEPHONE ENCOUNTER
Usually I place same day at her appointment.  Sounds like this should probably be scheduled for an annual/pap with IUD insert.  If she wants to discuss beforehand, we can do that but I am okay without a preconsult.    Thanks  Karlee Julio MD

## 2022-07-07 ASSESSMENT — ANXIETY QUESTIONNAIRES
5. BEING SO RESTLESS THAT IT IS HARD TO SIT STILL: NOT AT ALL
7. FEELING AFRAID AS IF SOMETHING AWFUL MIGHT HAPPEN: NOT AT ALL
GAD7 TOTAL SCORE: 0
2. NOT BEING ABLE TO STOP OR CONTROL WORRYING: NOT AT ALL
4. TROUBLE RELAXING: NOT AT ALL
1. FEELING NERVOUS, ANXIOUS, OR ON EDGE: NOT AT ALL
1. FEELING NERVOUS, ANXIOUS, OR ON EDGE: NOT AT ALL
7. FEELING AFRAID AS IF SOMETHING AWFUL MIGHT HAPPEN: NOT AT ALL
7. FEELING AFRAID AS IF SOMETHING AWFUL MIGHT HAPPEN: NOT AT ALL
5. BEING SO RESTLESS THAT IT IS HARD TO SIT STILL: NOT AT ALL
7. FEELING AFRAID AS IF SOMETHING AWFUL MIGHT HAPPEN: NOT AT ALL
8. IF YOU CHECKED OFF ANY PROBLEMS, HOW DIFFICULT HAVE THESE MADE IT FOR YOU TO DO YOUR WORK, TAKE CARE OF THINGS AT HOME, OR GET ALONG WITH OTHER PEOPLE?: NOT DIFFICULT AT ALL
2. NOT BEING ABLE TO STOP OR CONTROL WORRYING: NOT AT ALL
GAD7 TOTAL SCORE: 0
3. WORRYING TOO MUCH ABOUT DIFFERENT THINGS: NOT AT ALL
GAD7 TOTAL SCORE: 0
6. BECOMING EASILY ANNOYED OR IRRITABLE: NOT AT ALL
GAD7 TOTAL SCORE: 0
4. TROUBLE RELAXING: NOT AT ALL
8. IF YOU CHECKED OFF ANY PROBLEMS, HOW DIFFICULT HAVE THESE MADE IT FOR YOU TO DO YOUR WORK, TAKE CARE OF THINGS AT HOME, OR GET ALONG WITH OTHER PEOPLE?: NOT DIFFICULT AT ALL
3. WORRYING TOO MUCH ABOUT DIFFERENT THINGS: NOT AT ALL
6. BECOMING EASILY ANNOYED OR IRRITABLE: NOT AT ALL

## 2022-07-11 ENCOUNTER — VIRTUAL VISIT (OUTPATIENT)
Dept: INTERNAL MEDICINE | Facility: CLINIC | Age: 52
End: 2022-07-11
Payer: COMMERCIAL

## 2022-07-11 DIAGNOSIS — Z00.00 ENCOUNTER FOR PREVENTATIVE ADULT HEALTH CARE EXAMINATION: Primary | ICD-10-CM

## 2022-07-11 PROCEDURE — 99207 PR NO CHARGE LOS: CPT

## 2022-07-11 NOTE — PROGRESS NOTES
Health Maintenance:  Do you have a PCP? No  When was your last visit with your PCP?   When was your last eye exam? 1 month  Have you ever had a colonoscopy? No  If yes, when?   Have you ever had any polyps removed?     If Female: (25 years old+) When was your last pap smear ? 7/13/22  Have you ever had abnormal pap smear results? No    (40 years old+) When was your last mammogram? 6/10/21   If last mammogram was more than 1 year ago and patient would like to get  mammogram done as part of their  visit, ask the following questions:   1. Do you have any current or new breast problems? No   2. Where was your last mammogram done?  [facility]   3. Have your had any breast surgeries? No    As part of your visit we will set up a DEXA scan which will measure your body composition. We have a few questions that need to be answered before we can schedule this scan:   What is your approximate weight? 140   Have you ever had a DEXA scan within the past 2 years? No   Will you have any other imaging studies with contrast (x-ray, CT scan) within 7 days of this appointment? No   Have you had any spine or hip surgery? No   Do you take any vitamins that contain calcium or antacids with calcium? Yes    If yes, stop taking 24 hours prior to visit.     Goals for the Visit:  1. Thorough Comprehensive Preventive Exam  2.   3.   Pertinent past Medical/Family and Social HX:   Pertinent sx that desire are addressed with this visit:     Answers for HPI/ROS submitted by the patient on 7/7/2022  ARLET 7 TOTAL SCORE: 0  General Symptoms: No  Skin Symptoms: No  HENT Symptoms: No  EYE SYMPTOMS: No  HEART SYMPTOMS: No  LUNG SYMPTOMS: No  INTESTINAL SYMPTOMS: No  URINARY SYMPTOMS: No  GYNECOLOGIC SYMPTOMS: No  BREAST SYMPTOMS: No  SKELETAL SYMPTOMS: No  BLOOD SYMPTOMS: No  NERVOUS SYSTEM SYMPTOMS: No  MENTAL HEALTH SYMPTOMS: No        Instructions prior to appointment:   1. Fast beginning at 10 pm for lab appointment  2. If your preventive care assessment  package includes a Fitness Assessment, please bring athletic shoes. Complementary [facility] fitness attire is provided and yours to keep.  3. If eye exam, eyes may be dilated, it will last 4-6 hours, may want to bring sunglasses.   4. May bring laptop or other work materials for use during downtime.   5. You will receive an email about 3 days prior to your visit with a final itinerary, menu selections for the complementary breakfast and lunch and instructions for the visit.     Complimentary  Parking provided. Drop off car in front of [facility], take the patient elevators to the [facility]. When you enter in the lobby, identify yourself as an [facility] [atient and you will be escorted up to the clinic.   If questions arise prior to your appointment please contact the clinic at [phone].

## 2022-07-12 NOTE — PROGRESS NOTES
[facility] Dermatology Note  Encounter Date: Jul 13, 2022  Office Visit     Dermatology Problem List:  # NUB, R upper back. Favor inflammatory, but monitor for resolution  - Photodocumentation 7/13/2022  # Verruca vulgaris, L 3rd MTP palmar aspect s/p cryo 7/13/2022  ____________________________________________    Assessment & Plan:    # Verruca vulgaris, L 3rd MTP palmar aspect   Discussed etiology and treatment options. Will proceed with cryo today, and she can continue her home treatments as well. Discussed that multiple rounds of cryo may be needed for lesion to fully resolve and if she is interested in this, she can let us know.  - Cryotherapy today, see procedure note below  - Continue home treatment, recommend sal acid 40% nightly    # Neoplasm of uncertain behavior, R upper back, very nonspecific pink thin papule. Favor inflammatory, but monitor for resolution.  - Photodocumentation obtained for clinical monitoring   - She will let us know if does not resolve    # Multiple benign nevi.   - No concerning lesions today  - Monitor for ABCDEs of melanoma   - Continue sun protection - recommend SPF 30 or higher with frequent application   - Return sooner if noticing changing or symptomatic lesions    # Benign lesions: Solar lentigos, seborrheic keratoses, cherry angiomas, dermatofibroma. Explained to patient benign nature of lesion. No treatment is necessary at this time unless the lesion changes or becomes symptomatic.       Procedures Performed:   - Cryotherapy procedure note, location(s): See above. After verbal consent and discussion of risks and benefits including, but not limited to, dyspigmentation/scar, blister, and pain, 1 wart(s) was(were) treated with 1-2 mm freeze border for 1-2 cycles with liquid nitrogen. Post cryotherapy instructions were provided.      Follow-up: 1 year(s) in-person, or earlier for new or changing lesion on the back.    Staff and Scribe:     Scribe  Disclosure:  I, Eva Street, am serving as a scribe to document services personally performed by Yanci Chandler MD based on data collection and the provider's statements to me.     Provider Disclosure:   The documentation recorded by the scribe accurately reflects the services I personally performed and the decisions made by me.    Yanci Chandler MD    Department of Dermatology  [facility]: Phone: [phone], Fax: [phone]  7/19/2022     ____________________________________________    CC: Skin Check ([facility] skin check)    HPI:  Ms. Palmira Caraballo is a(n) 52 year old female who presents today as a new patient for FBSE. Today, the patient reports a small spot of concern on the palm of her left hand. She states she has tried OTC wart treatments without success. She denies personal or familial history of skin cancer. She states she burns easily and regularly wears sunscreen and has not had any significant sunburns in the past.     Patient is otherwise feeling well, without additional skin concerns.    Labs Reviewed:  N/A    Physical Exam:  Vitals: There were no vitals taken for this visit.  SKIN: Total skin excluding the undergarment areas was performed. The exam included the head/face, neck, both arms, chest, back, abdomen, both legs, digits and/or nails.   - Very nonspecific pink thin papule on right upper back. Dermoscopy shows no specific features.  - Left 3rd MTP palmar aspect there is a hyperkeratotic papule.   - There is a firm tan/flesh colored papule that dimples with lateral pressure on the left thigh.   - There are dome shaped bright red papules on the trunk and extremities.   - Multiple regular brown pigmented macules and papules are identified on the trunk and extremities.   - Scattered brown macules on sun exposed areas.  - There are waxy stuck on tan to brown papules on the trunk and  extremities.  - No other lesions of concern on areas examined.     Medications:  Current Outpatient Medications   Medication     clobetasol (TEMOVATE) 0.05 % external ointment     levonorgestrel (MIRENA) 20 MCG/24HR IUD     No current facility-administered medications for this visit.      Past Medical History:   Patient Active Problem List   Diagnosis     NO ACTIVE PROBLEMS     CARDIOVASCULAR SCREENING; LDL GOAL LESS THAN 160     Encounter for IUD insertion     Lichen sclerosus et atrophicus     Past Medical History:   Diagnosis Date     NO ACTIVE PROBLEMS         CC Referred Self, MD  No address on file on close of this encounter.

## 2022-07-13 ENCOUNTER — APPOINTMENT (OUTPATIENT)
Dept: INTERNAL MEDICINE | Facility: CLINIC | Age: 52
End: 2022-07-13

## 2022-07-13 ENCOUNTER — ANCILLARY PROCEDURE (OUTPATIENT)
Dept: MAMMOGRAPHY | Facility: CLINIC | Age: 52
End: 2022-07-13

## 2022-07-13 ENCOUNTER — OFFICE VISIT (OUTPATIENT)
Dept: AUDIOLOGY | Facility: CLINIC | Age: 52
End: 2022-07-13

## 2022-07-13 ENCOUNTER — OFFICE VISIT (OUTPATIENT)
Dept: GASTROENTEROLOGY | Facility: CLINIC | Age: 52
End: 2022-07-13

## 2022-07-13 ENCOUNTER — OFFICE VISIT (OUTPATIENT)
Dept: OPHTHALMOLOGY | Facility: CLINIC | Age: 52
End: 2022-07-13

## 2022-07-13 ENCOUNTER — OFFICE VISIT (OUTPATIENT)
Dept: INTERNAL MEDICINE | Facility: CLINIC | Age: 52
End: 2022-07-13

## 2022-07-13 ENCOUNTER — OFFICE VISIT (OUTPATIENT)
Dept: DERMATOLOGY | Facility: CLINIC | Age: 52
End: 2022-07-13
Payer: COMMERCIAL

## 2022-07-13 ENCOUNTER — ANCILLARY PROCEDURE (OUTPATIENT)
Dept: BONE DENSITY | Facility: CLINIC | Age: 52
End: 2022-07-13

## 2022-07-13 VITALS
HEART RATE: 57 BPM | DIASTOLIC BLOOD PRESSURE: 77 MMHG | RESPIRATION RATE: 16 BRPM | TEMPERATURE: 98.1 F | BODY MASS INDEX: 23.07 KG/M2 | HEIGHT: 67 IN | WEIGHT: 147 LBS | OXYGEN SATURATION: 100 % | SYSTOLIC BLOOD PRESSURE: 115 MMHG

## 2022-07-13 DIAGNOSIS — Z00.00 ENCOUNTER FOR PREVENTATIVE ADULT HEALTH CARE EXAMINATION: ICD-10-CM

## 2022-07-13 DIAGNOSIS — D18.01 CHERRY ANGIOMA: ICD-10-CM

## 2022-07-13 DIAGNOSIS — B07.9 VERRUCA VULGARIS: Primary | ICD-10-CM

## 2022-07-13 DIAGNOSIS — H10.13 ALLERGIC CONJUNCTIVITIS OF BOTH EYES: Primary | ICD-10-CM

## 2022-07-13 DIAGNOSIS — H52.13 MYOPIA OF BOTH EYES: ICD-10-CM

## 2022-07-13 DIAGNOSIS — D22.9 MULTIPLE BENIGN NEVI: ICD-10-CM

## 2022-07-13 DIAGNOSIS — D49.2 NEOPLASM OF UNSPECIFIED BEHAVIOR OF BONE, SOFT TISSUE, AND SKIN: ICD-10-CM

## 2022-07-13 DIAGNOSIS — Z00.00 ENCOUNTER FOR PREVENTATIVE ADULT HEALTH CARE EXAMINATION: Primary | ICD-10-CM

## 2022-07-13 DIAGNOSIS — D23.9 DERMATOFIBROMA: ICD-10-CM

## 2022-07-13 DIAGNOSIS — Z71.82 EXERCISE COUNSELING: Primary | ICD-10-CM

## 2022-07-13 DIAGNOSIS — Z83.719 FAMILY HISTORY OF COLONIC POLYPS: ICD-10-CM

## 2022-07-13 DIAGNOSIS — E03.8 SUBCLINICAL HYPOTHYROIDISM: ICD-10-CM

## 2022-07-13 DIAGNOSIS — Z01.10 HEARING WITHIN NORMAL LIMITS IN BOTH EARS: Primary | ICD-10-CM

## 2022-07-13 DIAGNOSIS — I73.00 RAYNAUD'S DISEASE WITHOUT GANGRENE: ICD-10-CM

## 2022-07-13 LAB
ALP SERPL-CCNC: 45 U/L (ref 40–150)
ALT SERPL W P-5'-P-CCNC: 22 U/L (ref 0–50)
BASOPHILS # BLD AUTO: 0 10E3/UL (ref 0–0.2)
BASOPHILS NFR BLD AUTO: 1 %
CHOLEST SERPL-MCNC: 178 MG/DL
CREAT SERPL-MCNC: 0.7 MG/DL (ref 0.52–1.04)
DEPRECATED CALCIDIOL+CALCIFEROL SERPL-MC: 30 UG/L (ref 20–75)
EOSINOPHIL # BLD AUTO: 0.1 10E3/UL (ref 0–0.7)
EOSINOPHIL NFR BLD AUTO: 1 %
ERYTHROCYTE [DISTWIDTH] IN BLOOD BY AUTOMATED COUNT: 12.6 % (ref 10–15)
FASTING STATUS PATIENT QL REPORTED: YES
FASTING STATUS PATIENT QL REPORTED: YES
GFR SERPL CREATININE-BSD FRML MDRD: >90 ML/MIN/1.73M2
GLUCOSE BLD-MCNC: 84 MG/DL (ref 70–99)
HCT VFR BLD AUTO: 41.5 % (ref 35–47)
HDLC SERPL-MCNC: 81 MG/DL
HGB BLD-MCNC: 13.5 G/DL (ref 11.7–15.7)
HIV 1+2 AB+HIV1 P24 AG SERPL QL IA: NONREACTIVE
HOLD SPECIMEN: NORMAL
HOLD SPECIMEN: NORMAL
IMM GRANULOCYTES # BLD: 0 10E3/UL
IMM GRANULOCYTES NFR BLD: 0 %
LDLC SERPL CALC-MCNC: 90 MG/DL
LYMPHOCYTES # BLD AUTO: 1.2 10E3/UL (ref 0.8–5.3)
LYMPHOCYTES NFR BLD AUTO: 28 %
MCH RBC QN AUTO: 28.2 PG (ref 26.5–33)
MCHC RBC AUTO-ENTMCNC: 32.5 G/DL (ref 31.5–36.5)
MCV RBC AUTO: 87 FL (ref 78–100)
MONOCYTES # BLD AUTO: 0.3 10E3/UL (ref 0–1.3)
MONOCYTES NFR BLD AUTO: 8 %
NEUTROPHILS # BLD AUTO: 2.7 10E3/UL (ref 1.6–8.3)
NEUTROPHILS NFR BLD AUTO: 62 %
NONHDLC SERPL-MCNC: 97 MG/DL
NRBC # BLD AUTO: 0 10E3/UL
NRBC BLD AUTO-RTO: 0 /100
PLATELET # BLD AUTO: 190 10E3/UL (ref 150–450)
RBC # BLD AUTO: 4.78 10E6/UL (ref 3.8–5.2)
TRIGL SERPL-MCNC: 33 MG/DL
TSH SERPL DL<=0.005 MIU/L-ACNC: 4.9 MU/L (ref 0.4–4)
WBC # BLD AUTO: 4.3 10E3/UL (ref 4–11)

## 2022-07-13 PROCEDURE — 77067 SCR MAMMO BI INCL CAD: CPT | Performed by: RADIOLOGY

## 2022-07-13 PROCEDURE — 36415 COLL VENOUS BLD VENIPUNCTURE: CPT | Performed by: PATHOLOGY

## 2022-07-13 PROCEDURE — 99203 OFFICE O/P NEW LOW 30 MIN: CPT | Mod: 25 | Performed by: DERMATOLOGY

## 2022-07-13 PROCEDURE — 96999 UNLISTED SPEC DERM SVC/PX: CPT | Performed by: INTERNAL MEDICINE

## 2022-07-13 PROCEDURE — 87389 HIV-1 AG W/HIV-1&-2 AB AG IA: CPT | Mod: 90 | Performed by: PATHOLOGY

## 2022-07-13 PROCEDURE — 84443 ASSAY THYROID STIM HORMONE: CPT | Performed by: PATHOLOGY

## 2022-07-13 PROCEDURE — 93010 ELECTROCARDIOGRAM REPORT: CPT | Performed by: INTERNAL MEDICINE

## 2022-07-13 PROCEDURE — 77080 DXA BONE DENSITY AXIAL: CPT | Performed by: INTERNAL MEDICINE

## 2022-07-13 PROCEDURE — 85025 COMPLETE CBC W/AUTO DIFF WBC: CPT | Performed by: PATHOLOGY

## 2022-07-13 PROCEDURE — 99207 PR NO BILLABLE SERVICE THIS VISIT: CPT | Performed by: DIETITIAN, REGISTERED

## 2022-07-13 PROCEDURE — 99000 SPECIMEN HANDLING OFFICE-LAB: CPT | Performed by: PATHOLOGY

## 2022-07-13 PROCEDURE — 17110 DESTRUCTION B9 LES UP TO 14: CPT | Performed by: DERMATOLOGY

## 2022-07-13 PROCEDURE — 77063 BREAST TOMOSYNTHESIS BI: CPT | Performed by: RADIOLOGY

## 2022-07-13 PROCEDURE — 82947 ASSAY GLUCOSE BLOOD QUANT: CPT | Performed by: PATHOLOGY

## 2022-07-13 PROCEDURE — 92557 COMPREHENSIVE HEARING TEST: CPT | Performed by: AUDIOLOGIST

## 2022-07-13 PROCEDURE — 99386 PREV VISIT NEW AGE 40-64: CPT | Performed by: INTERNAL MEDICINE

## 2022-07-13 PROCEDURE — 84460 ALANINE AMINO (ALT) (SGPT): CPT | Performed by: PATHOLOGY

## 2022-07-13 PROCEDURE — 82565 ASSAY OF CREATININE: CPT | Performed by: PATHOLOGY

## 2022-07-13 PROCEDURE — 94375 RESPIRATORY FLOW VOLUME LOOP: CPT | Performed by: INTERNAL MEDICINE

## 2022-07-13 PROCEDURE — 80061 LIPID PANEL: CPT | Performed by: PATHOLOGY

## 2022-07-13 PROCEDURE — 86803 HEPATITIS C AB TEST: CPT | Mod: 90 | Performed by: PATHOLOGY

## 2022-07-13 PROCEDURE — 84075 ASSAY ALKALINE PHOSPHATASE: CPT | Performed by: PATHOLOGY

## 2022-07-13 PROCEDURE — 99207 PR NO CHARGE LOS: CPT

## 2022-07-13 PROCEDURE — 82306 VITAMIN D 25 HYDROXY: CPT | Mod: 90 | Performed by: PATHOLOGY

## 2022-07-13 PROCEDURE — 92550 TYMPANOMETRY & REFLEX THRESH: CPT | Performed by: AUDIOLOGIST

## 2022-07-13 PROCEDURE — 92004 COMPRE OPH EXAM NEW PT 1/>: CPT | Performed by: OPTOMETRIST

## 2022-07-13 ASSESSMENT — REFRACTION_WEARINGRX
OS_SPHERE: -5.25
OD_CYLINDER: +1.00
OD_AXIS: 137
OD_SPHERE: -4.50
OS_AXIS: 009
OD_ADD: +1.75
OS_ADD: +1.75
OS_CYLINDER: +1.75

## 2022-07-13 ASSESSMENT — CONF VISUAL FIELD
OS_NORMAL: 1
OD_NORMAL: 1

## 2022-07-13 ASSESSMENT — CUP TO DISC RATIO
OS_RATIO: 0.1
OD_RATIO: 0.2

## 2022-07-13 ASSESSMENT — VISUAL ACUITY
OD_CC: 20/20
OS_CC: 20/20-
CORRECTION_TYPE: GLASSES
METHOD: SNELLEN - LINEAR

## 2022-07-13 ASSESSMENT — TONOMETRY
OS_IOP_MMHG: 15
IOP_METHOD: ICARE
OD_IOP_MMHG: 18

## 2022-07-13 ASSESSMENT — SLIT LAMP EXAM - LIDS
COMMENTS: NORMAL
COMMENTS: NORMAL

## 2022-07-13 ASSESSMENT — EXTERNAL EXAM - RIGHT EYE: OD_EXAM: NORMAL

## 2022-07-13 ASSESSMENT — EXTERNAL EXAM - LEFT EYE: OS_EXAM: NORMAL

## 2022-07-13 ASSESSMENT — PAIN SCALES - GENERAL: PAINLEVEL: NO PAIN (0)

## 2022-07-13 NOTE — PROGRESS NOTES
Ask how Beach [employer] on demand workouts are going.    See fitness action plan and patient instructions for details.

## 2022-07-13 NOTE — OUTPATIENT NURSE NOTE
07/13/22 1031   Fitness   Current Fitness Regimen:  Beach body on demand program 5-6 d/wk. Range 30-60 min/workout. Combination of strength and cardio with some stretching.   Fitness Goals Maintain fitness with age   Timeline   Recommended Activity this Week Continue current routine, but purchase heavier weights for legs.   Recommended Minutes per Day this Week 45 Min   Recommended Number of Days this Week 5 Per Day/Per Week   Recommended Activity this Month Continue current routine.   Recommended Duration this Month 45 Min/Hrs   Recommended Frequency this Month 5 Per Day/Per Week   Recommended Activity the Nex 3 Months Continue current routine   Recommended Duration the Nex 3 Months 45 Min/Hrs   Recommended Frequency the Nex 3 Months:  6 Per Day/Per Week    Strength    Strength (Right):  70 ml/kg/min    Strength (Left) 60.1 ml/kg/min

## 2022-07-13 NOTE — LETTER
Date:July 21, 2022      Patient was self referred, no letter generated. Do not send.        [facility] Health Information

## 2022-07-13 NOTE — PATIENT INSTRUCTIONS
It was nice meeting you today. Below are the nutrition recommendations we discussed at your visit.    Please let me know if you have any additional questions.    Nutrition Recommendations    1. Recommend increasing vegetable and fruit intake to consistently have at least 2 servings at your lunch and dinner meals (or can make half of your plates fruits and vegetables per the Plate method plan).    --You could bring some vegetables/cut up vegetables (such as baby carrots, grape tomatoes, cherry tomatoes, peeled and sliced kohlrabi, radishes, sliced bell peppers or mini bell peppers, sliced cucumbers or mini cucumbers as some examples) to have on the side of your lunch at work. (eat these in addition to your usual blueberries or other fruit you eat at lunch).    --Consistently include vegetables or salad along with dinner meal.    2. Can use the Plate method plan for general guidance on getting balanced meals which is as follows:     -Make half of your plate vegetables and fruit. (Aim for at least 1-2 cups of vegetables and/or fruit at each meal).     -Make 1/4 of your plate lean protein sources at a meal (salmon, other fish, skinless chicken or turkey breast, pork tenderloin, lean cuts of beef such as 93% lean, black beans or kidney or cummings beans, tofu, edamame, tempeh, eggs, egg whites, lowfat cottage cheese, lowfat yogurt/lowfat greek yogurt).     -Make the other 1/4 of your plate whole grain starches/grains/starchy vegetables at meals. Some examples include quinoa, brown rice, wild rice, barley, whole grain tortilla or starchy vegetables (potatoes, sweet potatoes, winter squash, peas, corn).     Include some healthy/unsaturated fat serving/s at a meal (avocados, nut butters, nuts/seeds, olive oil, vegetable oils, flaxseed, felipe seeds).     *Note: Recommendation is to eat at least 2-3 serving per day of some good sources of calcium per day (such as lowfat cottage cheese, lowfat yogurt, lowfat milk, tofu, salmon,  sardines, kale, spinach, soybeans, almonds as some examples).    3. Drink at least 48 oz-64 oz of water per day.    Thank you,    Isidra Bhatti, MS, RD, LD

## 2022-07-13 NOTE — PROGRESS NOTES
" History and Physical Examination     SUBJECTIVE: Chief concern: preventive health review.     Past Medical History:  1.   3 para 3, with normal spontaneous vaginal deliveries in 2000, 10/2002, and 2005.  2.  History of minimal subclinical hypothyroidism  3.  History of microscopic hematuria with negative evaluation at [facility], 10/2019     Adverse Drug Reactions: None.     Current Medications:    Levonorgestrol, 20 mcg/24 hour IUD.  Clobetasol 0.05%, apply twice a day as needed for itching.  Daily multivitamin supplement (\"Athletic Greens\")     Habits:  Tobacco: Never  Alcohol: 1-2 servings per week  Caffeine: None.  Street drugs: None     Social History:  to Fredrick, whom she met in high school, and mother of 3 daughters: Prudence, age 21, will be a senior at [employer] currently serving a communications internship with a [place] business; Mindy, age 19, who will be a sophomore in the [employer] at the [employer]; and Josefa, age 17, who will be a senior at [employer], where she plays hockey and lacrosse.   Palmira was born in [place] and lived in [place] and [place] before moving to [place] at age 12, when her father was transferred to the headquarters of the [employer]  After graduating from the [employer], she completed an TAISHA at the [employer] and has worked for [employer] for 23 years, currently leading the dairy foods division.  Away from work, she enjoys family activities, reading, and exercise.  She exercises for 40 minutes daily, following the [employer] program that includes aerobic and strength training.    Family History: Mother  in  at age 75 from complications of cortical basal degeneration and apraxia, with history of hypertension and TIA at age 65. Father has a history of colonic polyps at age 78.  A sister 2 years her senior has a " "history of colonic polyps.  A brother 4 years her selam is in good health.  Maternal grandmother with diagnosed with breast cancer in her 50s and  at age 90.  Maternal grandfather  from an unspecified aneurysm in his 50s.  Paternal grandmother  in her late 80s to early 90s.  Paternal grandfather  in his late 80s to 90s, with possible history of colon cancer.  Daughters are in good health.    Review of Systems: Occasional snoring, without daytime somnolence or significant insomnia.  Fingers and toes are \"always cold\", without pain, ulceration, or recalled history of pallor, rubor, or cyanosis; she reports that she is typically cooler than others, but indicates that this does not interfere with her usual activities.  Palmira is followed regularly by a gynecologist; combined HPV and Pap testing was negative in 2018.  No history of colonoscopy.  Colace (Pfizer) vaccinations administered on 3/13 and 4/3/2021; Covid (Moderna) booster was administered on 10/29/2021.  Tetanus (Td) was administered in 10/2019; Tdap was administered in .  No history of zoster vaccination.  Remainder of complete review of systems was negative.      OBJECTIVE:     Vital signs: Height 66.5 inches.  Weight 147 pounds.  Blood pressure 111/74 on average of 3 automated readings.  Heart rate 57.  Respiratory rate 16.  Temperature 98.1 degrees.  O2 saturation 100% on room air.    General: Alert, neatly dressed and groomed, in no acute distress.  HEENT: Atraumatic and normocephalic. Eyelids, pupils, and conjunctivae appeared normal. Lips, teeth and gums appear normal.  Oropharynx showed moist mucous membranes, without exudate or erythema. Somewhat \"crowded\" soft palate with relatively narrow airway.    Neck: Supple, without thyromegaly, mass, or bruit. No cervical or supraclavicular lymphadenopathy.  Back: No spinal or costovertebral angle tenderness.  Chest: Clear to auscultation and percussion. Normal respiratory " effort.  Cardiovascular: No jugular venous distention. Regular rate and rhythm, normal S1, S2 without murmur.  Abdomen: Bowel sounds positive; soft, nontender, without rebound, guarding, hepatosplenomegaly or mass.  Extremities: No cyanosis or edema. Cool fingers, without pallor or cyanosis.  Normal pulses throughout.    Breasts: Symmetric, with scattered fibro-nodular densities without dominant mass, nipple discharge, or lymphadenopathy.  Skin: Examination was deferred; full evaluation was completed earlier in day through dermatology clinic.  Neurologic: Cranial nerves II-XII were grossly intact. Sensory and motor examinations were normal. Normal gait.  Mini-cog score was 5/5.  Psychiatric: Alert and oriented ×3. Normal affect. Judgment and insight intact.  PHQ-2 score was 0.    Creatinine 0.70, alkaline phosphatase 45, ALT 22, cholesterol 178, HDL 81, LDL 90, triglycerides 33, cholesterol/HDL 2.2, TSH 4.90, 25-hydroxy vitamin D 30, glucose 84, white blood cell count 4300, hemoglobin 13.5, platelets 190,000, HIV nonreactive, hepatitis C screen pending.    EKG showed sinus bradycardia with first-degree AV block.  Spirometry showed an FEV1 of 3.54, with an FVC of 4.27; readings were 121% and 117% of predicted values, respectively.    DEXA showed normal bone density, with most negative and valid T-score of 1.1 at the left total hip and right femoral neck.  Body composition analysis showed 35.9% fat (55th percentile); body mass index was 23.4.    Mammogram was negative; annual mammography was recommended.     ASSESSMENT:    1.  Subclinical hypothyroidism.  Minimally elevated TSH, similar to level noted previously.  We discussed the symptoms to suggest clinical hypothyroidism and the importance of monitoring TSH.  She does describe mild cold intolerance that I believe represents Raynaud's phenomenon.    2.  Raynaud's phenomenon.  Mild symptoms, currently not bothersome.  We reviewed the options available for treatment,  "and the importance of pursuing further evaluation if pain, ulceration, or more pronounced symptoms arise.    3.  Snoring.  Associated with a narrow airway, without daytime somnolence.  Palmira was advised to ask her  to monitor her breathing during sleep and to pursue further evaluation if she observes loud snoring, gasping, or pauses in breathing during sleep.    4.  Preventive care.  Using her cholesterol fractionation and blood pressure data, guidelines from the AHA/ACC estimate her 10-year risk of a vascular event at 0.5%, lower than the 0.8% \"optimal\" risk for her age/gender cohort.  Colonoscopy was recommended at her earliest convenience.  I recommended recombinant zoster vaccination series at her convenience along with Covid booster vaccination.  She was advised to use a vitamin D3 supplement, 50 mcg daily, while maintaining daily calcium intake of 1200 mg, preferably from dietary sources.  We reviewed the debate regarding the benefit of multivitamin supplements and the importance of selecting a product that does not contain iron should she choose to proceed.  She was congratulated for her regimen of regular exercise and encouraged to continue to include weight-bearing exercise.  We reviewed the time economy associated with H.I.I.T. and the roles of aerobic, strength, and subthreshold exercise.  She was reminded that repeat Pap/HPV testing will be due in 1 year.    PLAN: See above.     ~SRT  Answers for HPI/ROS submitted by the patient on 7/7/2022  ARLET 7 TOTAL SCORE: 0  General Symptoms: No  Skin Symptoms: No  HENT Symptoms: No  EYE SYMPTOMS: No  HEART SYMPTOMS: No  LUNG SYMPTOMS: No  INTESTINAL SYMPTOMS: No  URINARY SYMPTOMS: No  GYNECOLOGIC SYMPTOMS: No  BREAST SYMPTOMS: No  SKELETAL SYMPTOMS: No  BLOOD SYMPTOMS: No  NERVOUS SYSTEM SYMPTOMS: No  MENTAL HEALTH SYMPTOMS: No      "

## 2022-07-13 NOTE — PROGRESS NOTES
[facility] Outpatient Medical Nutrition Therapy      Time Spent: 40 minutes  Session Type:  Initial   Referral Source: Shortlist Package/Dr. Ben Car  Reason for RD Visit:   Nutritional counseling     Nutrition Assessment:     Patient is a 52 y.o. female who is here for initial annual visit with Registered Dietitian (RD). She stated that she eats 2 meals per day and has 2 cups decaf coffee with 1% milk in the morning and skips breakfast. She initially started this when she was doing intermittent fasting, but she is specifically following this plan any longer. At that time she would eat meals in an 8-hour window during the day and fast/not eat for 16 hours overnight. On weekends she may eat breakfast though. She doesn't eat a lot of vegetables or fruit. She will have some berries at lunch and inconsistently has some vegetables with dinner but not necessarily every night. She does some cooking but reported not a big cook and does not necessarily make full balanced meals. Her kids are home from college so will make some meal for the family. In the summer months, she is better at eating more vegetables as well since her family likes fresh vegetables and do not like frozen. She typically doesn't snack unless very hungry, then has a cheese stick or granola bar. She drinks 2 c decaf coffee with 1% milk, estimated about 48-60 oz water per day. She adds a powdered fruit, veg supplement into one glass water in the morning. She discontinued this for awhile to see if it was really doing anything for her. She did feel more sluggish when she stopped taking it so restarted. She avoids caffeine because she has trouble sleeping through the night and will notice this more with drinking caffeinated beverages so avoids them now. Reviewed her veg-fruit supplement label today which does have some green tea powder. She takes it in the morning and reported in general tea is better tolerated and doesn't cause her to be  "more wakeful like coffee or caffeinated diet soda does. She will double check the caffeine content online later as it was not reported on the box. She drinks 1 caffeine free diet coke per week. She will only have 1 glass beer or wine about 1-2 times per week. Overall she doesn't have any specific nutrition goals at this time but interested in general healthful diet education.    Patient Active Problem List   Diagnosis     NO ACTIVE PROBLEMS     CARDIOVASCULAR SCREENING; LDL GOAL LESS THAN 160     Encounter for IUD insertion     Lichen sclerosus et atrophicus       Estimated body mass index is 23.37 kg/m  as calculated from the following:    Height as of an earlier encounter on 7/13/22: 1.689 m (5' 6.5\").    Weight as of an earlier encounter on 7/13/22: 66.7 kg (147 lb).    Diet Recall:  (some usual meals, snacks and beverages):  Meal Food    Breakfast Skips and just has 2 c decaf with 1% milk   Lunch Mini bagel with cream cheese and turkey, handful of blueberries and [employer] granola bar   Dinner Chicken/pork tenderloin with rice or rice and veg or sometimes just chicken or tacos or spaghetti sometimes with meatball and sometimes with salad/caesar salad or about once per week gets a burger and fries at sit down restaurant or salad or sandwich if eats out   Snacks Not usually. If really hungry has a cheese stick or [place] granola bar   Beverages 2 c decaf coffee with 1% milk, adds scoop of fruit-veg powdered supp to water in morning, about 3 large glasses of water per day (estimated a 16-20 oz glass), so ~48-60 oz water per day.   Alcohol Intake Has 1-2 drinks per week (either wine or beer)     Frequency of eating/taking out meals: ~1-2 times per week-burger or fries or salad or sandwich at dinner time.     Labs:    Last Comprehensive Metabolic Panel:  Sodium   Date Value Ref Range Status   07/28/2017 138 133 - 144 mmol/L Final     Potassium   Date Value Ref Range Status   07/28/2017 3.9 3.4 - 5.3 mmol/L Final "     Chloride   Date Value Ref Range Status   07/28/2017 105 94 - 109 mmol/L Final     Carbon Dioxide   Date Value Ref Range Status   07/28/2017 26 20 - 32 mmol/L Final     Anion Gap   Date Value Ref Range Status   07/28/2017 7 3 - 14 mmol/L Final     Glucose   Date Value Ref Range Status   07/13/2022 84 70 - 99 mg/dL Final   07/28/2017 85 70 - 99 mg/dL Final     Urea Nitrogen   Date Value Ref Range Status   07/28/2017 11 7 - 30 mg/dL Final     Creatinine   Date Value Ref Range Status   07/13/2022 0.70 0.52 - 1.04 mg/dL Final   07/28/2017 0.70 0.52 - 1.04 mg/dL Final     GFR Estimate   Date Value Ref Range Status   07/13/2022 >90 >60 mL/min/1.73m2 Final     Comment:     Effective December 21, 2021 eGFRcr in adults is calculated using the 2021 CKD-EPI creatinine equation which includes age and gender (Monique quinn al., NEJ, DOI: 10.1056/GSVKxn4229817)   07/28/2017 >90  Non  GFR Calc   >60 mL/min/1.7m2 Final     Calcium   Date Value Ref Range Status   07/28/2017 9.0 8.5 - 10.1 mg/dL Final       CBC RESULTS:   Recent Labs   Lab Test 07/13/22  0718   WBC 4.3   RBC 4.78   HGB 13.5   HCT 41.5   MCV 87   MCH 28.2   MCHC 32.5   RDW 12.6          Pertinent Medications/vitamin and mineral supplements:     Current Outpatient Medications   Medication     clobetasol (TEMOVATE) 0.05 % external ointment     levonorgestrel (MIRENA) 20 MCG/24HR IUD     No current facility-administered medications for this visit.       Food Allergies:  NKFA    Physical Activity:  6 days per week does about 40 minutes of exercises (usually cardio and weights) using an radha with exercise.    Nutrition Prescription: Recommended general healthful diet     Nutrition Intervention:    Nutrition Education/Counseling:  -Provided general healthful diet nutrition education with tips and suggestions.   -Reviewed the Plate method meal plan with food groups and some example foods in groups.   -Discussed general sodium recommendations of getting  less than 2300 mg/day. Discussed some low sodium diet tips.   -Reviewed the difference between unsaturated and saturated fats with recommendation to aim for choosing unsaturated fat over saturated fats and discussed examples of both.  -Reviewed general fiber recommendations to eat at least 25-35 g per day.  -Discussed general calcium recommendations to consume at least 2-3 serving of good calcium sources per day and discussed some examples.  -Encouraged patient to eat adequate fruit and vegetable servings per day (at least 5 servings but explained recommendation to aim for 9-11 servings per day).   -Discussed adequate hydration/recommendations and encouraged pt to drink at least 48 oz-64 oz (6-8 cups) per day.     Answered patient's questions. Patient verbalized understanding of education provided. Provided pt with RD contact information and list of goals below.     Educational Materials Provided:  [employer] Daily Nutrition Plate method handout     Goals:    1. Recommend increasing vegetable and fruit intake to consistently have at least 2 servings at your lunch and dinner meals (or can make half of your plates fruits and vegetables per the Plate method plan).    --You could bring some vegetables/cut up vegetables (such as baby carrots, grape tomatoes, cherry tomatoes, peeled and sliced kohlrabi, radishes, sliced bell peppers or mini bell peppers, sliced cucumbers or mini cucumbers as some examples) to have on the side of your lunch at work. (eat these in addition to your usual blueberries or other fruit you eat at lunch).    --Consistently include vegetables or salad along with dinner meal.    2. Can use the Plate method plan for general guidance on getting balanced meals which is as follows:     -Make half of your plate vegetables and fruit. (Aim for at least 1-2 cups of vegetables and/or fruit at each meal).     -Make 1/4 of your plate lean protein sources at a meal (salmon, other fish, skinless chicken or turkey  breast, pork tenderloin, lean cuts of beef such as 93% lean, black beans or kidney or cummings beans, tofu, edamame, tempeh, eggs, egg whites, lowfat cottage cheese, lowfat yogurt/lowfat greek yogurt).     -Make the other 1/4 of your plate whole grain starches/grains/starchy vegetables at meals. Some examples include quinoa, brown rice, wild rice, barley, whole grain tortilla or starchy vegetables (potatoes, sweet potatoes, winter squash, peas, corn).     Include some healthy/unsaturated fat servings at a meal (avocados, nut butters, nuts/seeds, olive oil, vegetable oils, flaxseed, felipe seeds).     *Note: Recommendation is to eat at least 2-3 serving per day of some good sources of calcium per day (such as lowfat cottage cheese, lowfat yogurt, lowfat milk, tofu, salmon, sardines, kale, spinach, soybeans, almonds as some examples).    3. Drink at least 48 oz-64 oz of water per day.    Nutrition Monitoring and Evaluation: Will monitor adherence to nutrition recommendations at future RD visits.     Further Medical Nutrition Therapy:  Annual visits    Patient was encouraged to call/contact RD with any further questions.    Isidra Bhatti MS, RD, LD

## 2022-07-13 NOTE — PROGRESS NOTES
Palmira Caraballo comes into clinic today at the request of Dr. ELENA Car Ordering Provider for EKG.    This service provided today was under the supervising provider of the day Dr. ELENA Car, who was available if needed.    Laura Jefferson, [facility]

## 2022-07-13 NOTE — LETTER
7/13/2022     RE: Palmira Caraballo  [address]  [place] [address]    Dear Colleague,    Thank you for referring your patient, Palmira Caraballo, to the [facility] [place]. Please see a copy of my visit note below.    [facility] Outpatient Medical Nutrition Therapy      Time Spent: 40 minutes  Session Type:  Initial   Referral Source: [facility] Package/Dr. Ben Car  Reason for RD Visit:   Nutritional counseling     Nutrition Assessment:     Patient is a 52 y.o. female who is here for initial annual visit with Registered Dietitian (RD). She stated that she eats 2 meals per day and has 2 cups decaf coffee with 1% milk in the morning and skips breakfast. She initially started this when she was doing intermittent fasting, but she is specifically following this plan any longer. At that time she would eat meals in an 8-hour window during the day and fast/not eat for 16 hours overnight. On weekends she may eat breakfast though. She doesn't eat a lot of vegetables or fruit. She will have some berries at lunch and inconsistently has some vegetables with dinner but not necessarily every night. She does some cooking but reported not a big cook and does not necessarily make full balanced meals. Her kids are home from college so will make some meal for the family. In the summer months, she is better at eating more vegetables as well since her family likes fresh vegetables and do not like frozen. She typically doesn't snack unless very hungry, then has a cheese stick or granola bar. She drinks 2 c decaf coffee with 1% milk, estimated about 48-60 oz water per day. She adds a powdered fruit, veg supplement into one glass water in the morning. She discontinued this for awhile to see if it was really doing anything for her. She did feel more sluggish when she stopped taking it so restarted. She avoids caffeine because she has trouble sleeping through the night and will  "notice this more with drinking caffeinated beverages so avoids them now. Reviewed her veg-fruit supplement label today which does have some green tea powder. She takes it in the morning and reported in general tea is better tolerated and doesn't cause her to be more wakeful like coffee or caffeinated diet soda does. She will double check the caffeine content online later as it was not reported on the box. She drinks 1 caffeine free diet coke per week. She will only have 1 glass beer or wine about 1-2 times per week. Overall she doesn't have any specific nutrition goals at this time but interested in general healthful diet education.    Patient Active Problem List   Diagnosis     NO ACTIVE PROBLEMS     CARDIOVASCULAR SCREENING; LDL GOAL LESS THAN 160     Encounter for IUD insertion     Lichen sclerosus et atrophicus       Estimated body mass index is 23.37 kg/m  as calculated from the following:    Height as of an earlier encounter on 7/13/22: 1.689 m (5' 6.5\").    Weight as of an earlier encounter on 7/13/22: 66.7 kg (147 lb).    Diet Recall:  (some usual meals, snacks and beverages):  Meal Food    Breakfast Skips and just has 2 c decaf with 1% milk   Lunch Mini bagel with cream cheese and turkey, handful of blueberries and [employer] granola bar   Dinner Chicken/pork tenderloin with rice or rice and veg or sometimes just chicken or tacos or spaghetti sometimes with meatball and sometimes with salad/caesar salad or about once per week gets a burger and fries at sit down restaurant or salad or sandwich if eats out   Snacks Not usually. If really hungry has a cheese stick or [place] granola bar   Beverages 2 c decaf coffee with 1% milk, adds scoop of fruit-veg powdered supp to water in morning, about 3 large glasses of water per day (estimated a 16-20 oz glass), so ~48-60 oz water per day.   Alcohol Intake Has 1-2 drinks per week (either wine or beer)     Frequency of eating/taking out meals: ~1-2 times per week-mckay " or fries or salad or sandwich at dinner time.     Labs:    Last Comprehensive Metabolic Panel:  Sodium   Date Value Ref Range Status   07/28/2017 138 133 - 144 mmol/L Final     Potassium   Date Value Ref Range Status   07/28/2017 3.9 3.4 - 5.3 mmol/L Final     Chloride   Date Value Ref Range Status   07/28/2017 105 94 - 109 mmol/L Final     Carbon Dioxide   Date Value Ref Range Status   07/28/2017 26 20 - 32 mmol/L Final     Anion Gap   Date Value Ref Range Status   07/28/2017 7 3 - 14 mmol/L Final     Glucose   Date Value Ref Range Status   07/13/2022 84 70 - 99 mg/dL Final   07/28/2017 85 70 - 99 mg/dL Final     Urea Nitrogen   Date Value Ref Range Status   07/28/2017 11 7 - 30 mg/dL Final     Creatinine   Date Value Ref Range Status   07/13/2022 0.70 0.52 - 1.04 mg/dL Final   07/28/2017 0.70 0.52 - 1.04 mg/dL Final     GFR Estimate   Date Value Ref Range Status   07/13/2022 >90 >60 mL/min/1.73m2 Final     Comment:     Effective December 21, 2021 eGFRcr in adults is calculated using the 2021 CKD-EPI creatinine equation which includes age and gender (Monique et al., NEJ, DOI: 10.1056/RESGqd0879087)   07/28/2017 >90  Non  GFR Calc   >60 mL/min/1.7m2 Final     Calcium   Date Value Ref Range Status   07/28/2017 9.0 8.5 - 10.1 mg/dL Final       CBC RESULTS:   Recent Labs   Lab Test 07/13/22  0718   WBC 4.3   RBC 4.78   HGB 13.5   HCT 41.5   MCV 87   MCH 28.2   MCHC 32.5   RDW 12.6          Pertinent Medications/vitamin and mineral supplements:     Current Outpatient Medications   Medication     clobetasol (TEMOVATE) 0.05 % external ointment     levonorgestrel (MIRENA) 20 MCG/24HR IUD     No current facility-administered medications for this visit.       Food Allergies:  NKFA    Physical Activity:  6 days per week does about 40 minutes of exercises (usually cardio and weights) using an radha with exercise.    Nutrition Prescription: Recommended general healthful diet     Nutrition Intervention:     Nutrition Education/Counseling:  -Provided general healthful diet nutrition education with tips and suggestions.   -Reviewed the Plate method meal plan with food groups and some example foods in groups.   -Discussed general sodium recommendations of getting less than 2300 mg/day. Discussed some low sodium diet tips.   -Reviewed the difference between unsaturated and saturated fats with recommendation to aim for choosing unsaturated fat over saturated fats and discussed examples of both.  -Reviewed general fiber recommendations to eat at least 25-35 g per day.  -Discussed general calcium recommendations to consume at least 2-3 serving of good calcium sources per day and discussed some examples.  -Encouraged patient to eat adequate fruit and vegetable servings per day (at least 5 servings but explained recommendation to aim for 9-11 servings per day).   -Discussed adequate hydration/recommendations and encouraged pt to drink at least 48 oz-64 oz (6-8 cups) per day.     Answered patient's questions. Patient verbalized understanding of education provided. Provided pt with RD contact information and list of goals below.     Educational Materials Provided:  [facility] Daily Nutrition Plate method handout     Goals:    1. Recommend increasing vegetable and fruit intake to consistently have at least 2 servings at your lunch and dinner meals (or can make half of your plates fruits and vegetables per the Plate method plan).    --You could bring some vegetables/cut up vegetables (such as baby carrots, grape tomatoes, cherry tomatoes, peeled and sliced kohlrabi, radishes, sliced bell peppers or mini bell peppers, sliced cucumbers or mini cucumbers as some examples) to have on the side of your lunch at work. (eat these in addition to your usual blueberries or other fruit you eat at lunch).    --Consistently include vegetables or salad along with dinner meal.    2. Can use the Plate method plan for general guidance on getting  balanced meals which is as follows:     -Make half of your plate vegetables and fruit. (Aim for at least 1-2 cups of vegetables and/or fruit at each meal).     -Make 1/4 of your plate lean protein sources at a meal (salmon, other fish, skinless chicken or turkey breast, pork tenderloin, lean cuts of beef such as 93% lean, black beans or kidney or cummings beans, tofu, edamame, tempeh, eggs, egg whites, lowfat cottage cheese, lowfat yogurt/lowfat greek yogurt).     -Make the other 1/4 of your plate whole grain starches/grains/starchy vegetables at meals. Some examples include quinoa, brown rice, wild rice, barley, whole grain tortilla or starchy vegetables (potatoes, sweet potatoes, winter squash, peas, corn).     Include some healthy/unsaturated fat servings at a meal (avocados, nut butters, nuts/seeds, olive oil, vegetable oils, flaxseed, felipe seeds).     *Note: Recommendation is to eat at least 2-3 serving per day of some good sources of calcium per day (such as lowfat cottage cheese, lowfat yogurt, lowfat milk, tofu, salmon, sardines, kale, spinach, soybeans, almonds as some examples).    3. Drink at least 48 oz-64 oz of water per day.    Nutrition Monitoring and Evaluation: Will monitor adherence to nutrition recommendations at future RD visits.     Further Medical Nutrition Therapy:  Annual visits    Patient was encouraged to call/contact RD with any further questions.    Isidra Bhatti MS, RD, LD            Again, thank you for allowing me to participate in the care of your patient.        Sincerely,        Isidra Bhatti RD

## 2022-07-13 NOTE — PATIENT INSTRUCTIONS
Palmira,    It was great to meet you at [facility]. Both your  strength and VO2max are above average and you've got a balanced and varied routine. Your  strength percentiles are 64th and 60th for your right and left sides, respectively. I only have a few recommendations.    Weight training    Purchase heavier weights for your legs. This can be an adjustable dumbbell, a kettlebell, or something else. Of my short list of recommendations, this is the most important one.    Cardiovascular exercise    If you desire to perform a cardio workout that more specifically targets your VO2max, choose a HIIT style cardio focused workout from [employer]. Doing this 1-2 times per week is plenty.      If you have questions, please email me at [email or web address].    Sincerely,    Andrea Sweeney MS, Exercise Physiology

## 2022-07-13 NOTE — NURSING NOTE
AHA BP    1st    109/69  2nd   108/76  3rd    115/77    Average   111/74  Laura Jefferson CMA 8:42 AM on 7/13/2022

## 2022-07-13 NOTE — NURSING NOTE
Dermatology Rooming Note    Palmira Caraballo's goals for this visit include:   Chief Complaint   Patient presents with     Skin Check     [facility] skin check     Iram Macias CMA

## 2022-07-13 NOTE — LETTER
7/13/2022       RE: Palmira Caraballo  [address]  [place] [address]     Dear Colleague,    Thank you for referring your patient, Palmira Caraballo, to the [facility] DERMATOLOG[facility] [place] [facility]. Please see a copy of my visit note below.    [facility] Dermatology Note  Encounter Date: Jul 13, 2022  Office Visit     Dermatology Problem List:  # NUB, R upper back. Favor inflammatory, but monitor for resolution  - Photodocumentation 7/13/2022  # Verruca vulgaris, L 3rd MTP palmar aspect s/p cryo 7/13/2022  ____________________________________________    Assessment & Plan:    # Verruca vulgaris, L 3rd MTP palmar aspect   Discussed etiology and treatment options. Will proceed with cryo today, and she can continue her home treatments as well. Discussed that multiple rounds of cryo may be needed for lesion to fully resolve and if she is interested in this, she can let us know.  - Cryotherapy today, see procedure note below  - Continue home treatment, recommend sal acid 40% nightly    # Neoplasm of uncertain behavior, R upper back, very nonspecific pink thin papule. Favor inflammatory, but monitor for resolution.  - Photodocumentation obtained for clinical monitoring   - She will let us know if does not resolve    # Multiple benign nevi.   - No concerning lesions today  - Monitor for ABCDEs of melanoma   - Continue sun protection - recommend SPF 30 or higher with frequent application   - Return sooner if noticing changing or symptomatic lesions    # Benign lesions: Solar lentigos, seborrheic keratoses, cherry angiomas, dermatofibroma. Explained to patient benign nature of lesion. No treatment is necessary at this time unless the lesion changes or becomes symptomatic.       Procedures Performed:   - Cryotherapy procedure note, location(s): See above. After verbal consent and discussion of risks and benefits including, but not  limited to, dyspigmentation/scar, blister, and pain, 1 wart(s) was(were) treated with 1-2 mm freeze border for 1-2 cycles with liquid nitrogen. Post cryotherapy instructions were provided.      Follow-up: 1 year(s) in-person, or earlier for new or changing lesion on the back.    Staff and Scribe:     Scribe Disclosure:  I, Eva Street, am serving as a scribe to document services personally performed by Yanci Chandler MD based on data collection and the provider's statements to me.     Provider Disclosure:   The documentation recorded by the scribe accurately reflects the services I personally performed and the decisions made by me.    Yanci Chandler MD    Department of Dermatology  [facility]: Phone: [phone], Fax: [phone]  7/19/2022     ____________________________________________    CC: Skin Check ([facility] skin check)    HPI:  Ms. Palmira Caraballo is a(n) 52 year old female who presents today as a new patient for FBSE. Today, the patient reports a small spot of concern on the palm of her left hand. She states she has tried OTC wart treatments without success. She denies personal or familial history of skin cancer. She states she burns easily and regularly wears sunscreen and has not had any significant sunburns in the past.     Patient is otherwise feeling well, without additional skin concerns.    Labs Reviewed:  N/A    Physical Exam:  Vitals: There were no vitals taken for this visit.  SKIN: Total skin excluding the undergarment areas was performed. The exam included the head/face, neck, both arms, chest, back, abdomen, both legs, digits and/or nails.   - Very nonspecific pink thin papule on right upper back. Dermoscopy shows no specific features.  - Left 3rd MTP palmar aspect there is a hyperkeratotic papule.   - There is a firm tan/flesh colored papule that dimples with lateral pressure on the  left thigh.   - There are dome shaped bright red papules on the trunk and extremities.   - Multiple regular brown pigmented macules and papules are identified on the trunk and extremities.   - Scattered brown macules on sun exposed areas.  - There are waxy stuck on tan to brown papules on the trunk and extremities.  - No other lesions of concern on areas examined.     Medications:  Current Outpatient Medications   Medication     clobetasol (TEMOVATE) 0.05 % external ointment     levonorgestrel (MIRENA) 20 MCG/24HR IUD     No current facility-administered medications for this visit.      Past Medical History:   Patient Active Problem List   Diagnosis     NO ACTIVE PROBLEMS     CARDIOVASCULAR SCREENING; LDL GOAL LESS THAN 160     Encounter for IUD insertion     Lichen sclerosus et atrophicus     Past Medical History:   Diagnosis Date     NO ACTIVE PROBLEMS         CC Referred Self, MD  No address on file on close of this encounter.      Again, thank you for allowing me to participate in the care of your patient.      Sincerely,    Yanci Chandler MD

## 2022-07-13 NOTE — NURSING NOTE
Chief Complaint   Patient presents with     Physical     Patient is here for annual physical     Laura Jefferson CMA 7:07 AM on 7/13/2022.

## 2022-07-13 NOTE — PROGRESS NOTES
History  HPI     Annual Eye Exam     In both eyes.  Associated symptoms include Negative for dryness, eye pain, redness, flashes and floaters. 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 Had a complete eye exam within the last month, but was not dilated. Would like to defer refraction.  Other than myopia, no previous eye issues.           Last edited by Javed Sousa, YOMI on 7/13/2022  8:03 AM. (History)          Assessment/Plan  (H10.13) Allergic conjunctivitis of both eyes  (primary encounter diagnosis)  Comment: Mild papillary conjunctivitis both eyes, asymptomatic  Plan:  Educated patient on clinical findings. Recommended Pataday as needed during allergy season. No treatment indicated at this time. Monitor annually.    (H52.13) Myopia of both eyes  Comment: Not assessed at this visit, happy with glasses, trying monovision contact lenses for the first time  Plan:  Monitor as needed.    Return to clinic in 1 year for comprehensive eye exam.    Complete documentation of historical and exam elements from today's encounter can  be found in the full encounter summary report (not reduplicated in this progress  note). I personally obtained the chief complaint(s) and history of present illness. I  confirmed and edited as necessary the review of systems, past medical/surgical  history, family history, social history, and examination findings as documented by  others; and I examined the patient myself. I personally reviewed the relevant tests,  images, and reports as documented above. I formulated and edited as necessary the  assessment and plan and discussed the findings and management plan with the  patient and family.    Javed Sousa, OD, [facility]

## 2022-07-13 NOTE — PATIENT INSTRUCTIONS
Cryotherapy    What is it?  Use of a very cold liquid, such as liquid nitrogen, to freeze and destroy abnormal skin cells that need to be removed    What should I expect?  Tenderness and redness  A small blister that might grow and fill with dark purple blood. There may be crusting.  More than one treatment may be needed if the lesions do not go away.    How do I care for the treated area?  Gently wash the area with your hands when bathing.  Use a thin layer of Vaseline to help with healing. You may use a Band-Aid.   The area should heal within 7-10 days and may leave behind a pink or lighter color.   Do not use an antibiotic or Neosporin ointment.   You may take acetaminophen (Tylenol) for pain.     Call your doctor if you have:  Severe pain  Signs of infection (warmth, redness, cloudy yellow drainage, and or a bad smell)  Questions or concerns    Who should I call with questions?      [facility]: [phone]      [facility]: [phone]      For urgent needs outside of business hours call the [facility] at [phone] and ask for the dermatology resident on call

## 2022-07-13 NOTE — LETTER
"2022       RE: Palmira Caraballo  [address] [place] [address]     Dear Colleague,    Thank you for referring your patient, Palmira Caraballo, to the [facility] at [facility]. Please see a copy of my visit note below.     History and Physical Examination     SUBJECTIVE: Chief concern: preventive health review.     Past Medical History:  1.   3 para 3, with normal spontaneous vaginal deliveries in 2000, 10/2002, and 2005.  2.  History of minimal subclinical hypothyroidism  3.  History of microscopic hematuria with negative evaluation at [facility], 10/2019     Adverse Drug Reactions: None.     Current Medications:    Levonorgestrol, 20 mcg/24 hour IUD.  Clobetasol 0.05%, apply twice a day as needed for itching.  Daily multivitamin supplement (\"Athletic Greens\")     Habits:  Tobacco: Never  Alcohol: 1-2 servings per week  Caffeine: None.  Street drugs: None     Social History:  to Fredrick, whom she met in high school, and mother of 3 daughters: Prudence, age 21, will be a senior at [employer] currently serving a communications internship with a [employer] business; Mindy, age 19, who will be a sophomore in the [employer] at the [employer]; and Joesfa, age 17, who will be a senior at [employer], where she plays hockey and lacrosse.   Palmira was born in [place] and lived in [place] and [place] before moving to [place] at age 12, when her father was transferred to the headquarters of the [employer].  After graduating from the [employer], she completed an TAISHA at the [employer] and has worked for [employer] for 23 years, currently leading the dairy foods division.  Away from work, she enjoys family activities, reading, and exercise.  She exercises for 40 minutes daily, " "following the [employer] program that includes aerobic and strength training.    Family History: Mother  in  at age 75 from complications of cortical basal degeneration and apraxia, with history of hypertension and TIA at age 65. Father has a history of colonic polyps at age 78.  A sister 2 years her senior has a history of colonic polyps.  A brother 4 years her selam is in good health.  Maternal grandmother with diagnosed with breast cancer in her 50s and  at age 90.  Maternal grandfather  from an unspecified aneurysm in his 50s.  Paternal grandmother  in her late 80s to early 90s.  Paternal grandfather  in his late 80s to 90s, with possible history of colon cancer.  Daughters are in good health.    Review of Systems: Occasional snoring, without daytime somnolence or significant insomnia.  Fingers and toes are \"always cold\", without pain, ulceration, or recalled history of pallor, rubor, or cyanosis; she reports that she is typically cooler than others, but indicates that this does not interfere with her usual activities.  Palmira is followed regularly by a gynecologist; combined HPV and Pap testing was negative in 2018.  No history of colonoscopy.  Colace (Pfizer) vaccinations administered on 3/13 and 4/3/2021; Covid (Moderna) booster was administered on 10/29/2021.  Tetanus (Td) was administered in 10/2019; Tdap was administered in .  No history of zoster vaccination.  Remainder of complete review of systems was negative.      OBJECTIVE:     Vital signs: Height 66.5 inches.  Weight 147 pounds.  Blood pressure 111/74 on average of 3 automated readings.  Heart rate 57.  Respiratory rate 16.  Temperature 98.1 degrees.  O2 saturation 100% on room air.    General: Alert, neatly dressed and groomed, in no acute distress.  HEENT: Atraumatic and normocephalic. Eyelids, pupils, and conjunctivae appeared normal. Lips, teeth and gums appear normal.  Oropharynx showed moist mucous " "membranes, without exudate or erythema. Somewhat \"crowded\" soft palate with relatively narrow airway.    Neck: Supple, without thyromegaly, mass, or bruit. No cervical or supraclavicular lymphadenopathy.  Back: No spinal or costovertebral angle tenderness.  Chest: Clear to auscultation and percussion. Normal respiratory effort.  Cardiovascular: No jugular venous distention. Regular rate and rhythm, normal S1, S2 without murmur.  Abdomen: Bowel sounds positive; soft, nontender, without rebound, guarding, hepatosplenomegaly or mass.  Extremities: No cyanosis or edema. Cool fingers, without pallor or cyanosis.  Normal pulses throughout.    Breasts: Symmetric, with scattered fibro-nodular densities without dominant mass, nipple discharge, or lymphadenopathy.  Skin: Examination was deferred; full evaluation was completed earlier in day through dermatology clinic.  Neurologic: Cranial nerves II-XII were grossly intact. Sensory and motor examinations were normal. Normal gait.  Mini-cog score was 5/5.  Psychiatric: Alert and oriented ×3. Normal affect. Judgment and insight intact.  PHQ-2 score was 0.    Creatinine 0.70, alkaline phosphatase 45, ALT 22, cholesterol 178, HDL 81, LDL 90, triglycerides 33, cholesterol/HDL 2.2, TSH 4.90, 25-hydroxy vitamin D 30, glucose 84, white blood cell count 4300, hemoglobin 13.5, platelets 190,000, HIV nonreactive, hepatitis C screen pending.    EKG showed sinus bradycardia with first-degree AV block.  Spirometry showed an FEV1 of 3.54, with an FVC of 4.27; readings were 121% and 117% of predicted values, respectively.    DEXA showed normal bone density, with most negative and valid T-score of 1.1 at the left total hip and right femoral neck.  Body composition analysis showed 35.9% fat (55th percentile); body mass index was 23.4.    Mammogram was negative; annual mammography was recommended.     ASSESSMENT:    1.  Subclinical hypothyroidism.  Minimally elevated TSH, similar to level noted " "previously.  We discussed the symptoms to suggest clinical hypothyroidism and the importance of monitoring TSH.  She does describe mild cold intolerance that I believe represents Raynaud's phenomenon.    2.  Raynaud's phenomenon.  Mild symptoms, currently not bothersome.  We reviewed the options available for treatment, and the importance of pursuing further evaluation if pain, ulceration, or more pronounced symptoms arise.    3.  Snoring.  Associated with a narrow airway, without daytime somnolence.  Palmira was advised to ask her  to monitor her breathing during sleep and to pursue further evaluation if she observes loud snoring, gasping, or pauses in breathing during sleep.    4.  Preventive care.  Using her cholesterol fractionation and blood pressure data, guidelines from the AHA/ACC estimate her 10-year risk of a vascular event at 0.5%, lower than the 0.8% \"optimal\" risk for her age/gender cohort.  Colonoscopy was recommended at her earliest convenience.  I recommended recombinant zoster vaccination series at her convenience along with Covid booster vaccination.  She was advised to use a vitamin D3 supplement, 50 mcg daily, while maintaining daily calcium intake of 1200 mg, preferably from dietary sources.  We reviewed the debate regarding the benefit of multivitamin supplements and the importance of selecting a product that does not contain iron should she choose to proceed.  She was congratulated for her regimen of regular exercise and encouraged to continue to include weight-bearing exercise.  We reviewed the time economy associated with H.I.I.T. and the roles of aerobic, strength, and subthreshold exercise.  She was reminded that repeat Pap/HPV testing will be due in 1 year.    PLAN: See above.     ~SRT  Answers for HPI/ROS submitted by the patient on 7/7/2022  ARLET 7 TOTAL SCORE: 0  General Symptoms: No  Skin Symptoms: No  HENT Symptoms: No  EYE SYMPTOMS: No  HEART SYMPTOMS: No  LUNG SYMPTOMS: " No  INTESTINAL SYMPTOMS: No  URINARY SYMPTOMS: No  GYNECOLOGIC SYMPTOMS: No  BREAST SYMPTOMS: No  SKELETAL SYMPTOMS: No  BLOOD SYMPTOMS: No  NERVOUS SYSTEM SYMPTOMS: No  MENTAL HEALTH SYMPTOMS: No    Again, thank you for allowing me to participate in the care of your patient.      Sincerely,    Ben Car MD

## 2022-07-13 NOTE — PROGRESS NOTES
AUDIOLOGY REPORT    [facility] Visit    SUMMARY: Audiology visit completed. See audiogram for results.      RECOMMENDATIONS: Follow-up with Dr. Car. Return to monitor hearing if changes are noted or new ear symptoms arise.    TERESA Posey.  Audiology Doctoral Student     I was present with the patient for the entire Audiology appointment including all procedures/testing performed by the AuD student, and agree with the student s assessment and plan as documented.      Jodie Ayers.  Licensed Audiologist  [place] # [id number]

## 2022-07-14 LAB
ATRIAL RATE - MUSE: 48 BPM
DIASTOLIC BLOOD PRESSURE - MUSE: NORMAL MMHG
HCV AB SERPL QL IA: NONREACTIVE
HCV AB SERPL QL IA: NONREACTIVE
INTERPRETATION ECG - MUSE: NORMAL
P AXIS - MUSE: 44 DEGREES
PR INTERVAL - MUSE: 242 MS
QRS DURATION - MUSE: 90 MS
QT - MUSE: 426 MS
QTC - MUSE: 380 MS
R AXIS - MUSE: 65 DEGREES
SYSTOLIC BLOOD PRESSURE - MUSE: NORMAL MMHG
T AXIS - MUSE: 55 DEGREES
VENTRICULAR RATE- MUSE: 48 BPM

## 2022-08-01 ENCOUNTER — TELEPHONE (OUTPATIENT)
Dept: GASTROENTEROLOGY | Facility: CLINIC | Age: 52
End: 2022-08-01

## 2022-08-01 NOTE — TELEPHONE ENCOUNTER
Screening Questions    BlueKIND OF PREP RedLOCATION [review exclusion criteria] GreenSEDATION TYPE      1. Are you active on mychart? Y    2. What insurance is in the chart? BCBS [place]      3.   Ordering/Referring Provider:     4. BMI   (If greater than 40 review exclusion criteria [PAC APPT IF [MAC] @ [facility])  23.7  [If yes, BMI OVER 40-EXTENDED PREP]      **(Sedation review/consideration needed)**  Do you have a legal guardian or Medical Power of    and/or are you able to give consent for your medical care?     Y    5. Have you had a positive covid test in the last 90 days?   N - N    6.  Are you currently on dialysis?   N [ If yes, G-PREP & HOSPITAL setting ONLY]     7.  Do you have chronic kidney disease?  N [ If yes, G-PREP ]    8.   Do you have a diagnosis of diabetes?   N   [ If yes, G-PREP ]    9.  On a regular basis do you go 3-5 days between bowel movements?   N   [ If yes, EXTENDED PREP]    10.  Are you taking any prescription pain medications on a routine schedule?    N - N [ If yes, EXTENDED PREP] [If yes, MAC]      11.   Do you have any chemical dependencies such as alcohol, street drugs, or methadone?    N [If yes, MAC]    12.   Do you have any history of post-traumatic stress syndrome, severe anxiety or history of psychosis?    N  [If yes, MAC]    13.  [FEMALES] Are you currently pregnant? N    If yes, how many weeks?       Respiratory/Heart Screening:  [If yes to any of the following HOSPITAL setting only]     14. Do you have Pulmonary Hypertension [Lungs]?   N       15. Do you have UNCONTROLLED asthma?   N     16.  Do you use daily home oxygen?  N      17. Do you have mod to severe Obstructive Sleep Apnea?         (OKAY @ [facility]  [facility]  SH  PH  RI  MG - if pt is not on OXYGEN)  N      18.   Have you had a heart or lung transplant?   N      19.   Have you had a stroke or Transient ischemic attack (TIA - aka  mini stroke ) within 6 months?  (If yes, please review exclusion criteria)  N      20.   In the past 6 months, have you had any heart related issues including cardiomyopathy or heart attack?   N           If yes, did it require cardiac stenting or other implantable device?   N      21.   Do you have any implantable devices in your body (pacemaker, defib, LVAD)? (If yes, please review exclusion criteria)  N   22.  Do you take the medication Phentermine?  NO        23. Do you take nitroglycerin?   N           If yes, how often? N  (if yes, HOSPITAL setting ONLY)    24.  Are you currently taking any blood thinners?    [If yes, INFORM patient to follw up w/ ORDERING PROVIDER FOR BRIDGING INSTRUCTIONS]     N    25.   Do you transfer independently?                (If NO, please HOSPITAL setting ONLY)  Y    26.   Preferred LOCAL Pharmacy for Pre Prescription:      [id number] - [place], [place] - [address] AT [facility] [address]    Scheduling Details  (Please ask for phone number if not scheduled by patient)      Caller : MARTIN   Date of Procedure: 10/7  Surgeon: TEN   [place] [facility]        Sedation Type: MODERATE  l   Conscious Sedation- Needs  for 6 hours after the procedure  MAC/General-Needs  for 24 hours after procedure    N :[Pre-op Required] at [facility]  SH  MG and OR for MAC sedation   (advise patient they will need a pre-op WITH IN 30 DAYS of procedure date)     Type of Procedure Scheduled:   Lower Endoscopy [Colonoscopy]    Which Colonoscopy Prep was Sent?: MIRALAX  -     ANJUUTS CF PATIENTS & GROEN'S PATIENTS NEEDS EXTENDED PREP       Informed patient they will need an adult  Y  Cannot take any type of public or medical transportation alone    Pre-Procedure Covid test to be completed at [facility] or Externally: Y  **INFORMED OF HOME TESTING & LAB OPTION**        Confirmed Nurse will call to complete assessment Y    Additional comments: N

## 2022-08-09 LAB
EXPTIME-PRE: 6.85 SEC
FEF2575-%PRED-PRE: 144 %
FEF2575-PRE: 4.08 L/SEC
FEF2575-PRED: 2.82 L/SEC
FEFMAX-%PRED-PRE: 89 %
FEFMAX-PRE: 6.21 L/SEC
FEFMAX-PRED: 6.97 L/SEC
FEV1-%PRED-PRE: 121 %
FEV1-PRE: 3.54 L
FEV1FEV6-PRE: 83 %
FEV1FEV6-PRED: 82 %
FEV1FVC-PRE: 83 %
FEV1FVC-PRED: 80 %
FIFMAX-PRE: 4.59 L/SEC
FVC-%PRED-PRE: 117 %
FVC-PRE: 4.27 L
FVC-PRED: 3.64 L

## 2022-09-28 ENCOUNTER — TELEPHONE (OUTPATIENT)
Dept: GASTROENTEROLOGY | Facility: CLINIC | Age: 52
End: 2022-09-28

## 2022-09-28 DIAGNOSIS — Z12.11 ENCOUNTER FOR SCREENING COLONOSCOPY: Primary | ICD-10-CM

## 2022-09-28 RX ORDER — BISACODYL 5 MG/1
TABLET, DELAYED RELEASE ORAL
Qty: 4 TABLET | Refills: 0 | Status: SHIPPED | OUTPATIENT
Start: 2022-09-28 | End: 2023-08-18

## 2022-09-28 NOTE — TELEPHONE ENCOUNTER
Called the Pt to discuss below, no answer, LM.     Patient scheduled for Colonoscopy on 10/7/22.     Discuss at home test option.     Arrival time: 0920    Facility location: [facility] [place]    Sedation type: CS    Indication for procedure: Screening    Anticoagulations? None     Bowel prep recommendation: GoLytely    GoLytely prep sent to [id number] - [place], [place] - [address] AT [facility]. Prep instructions sent via [employer]    Pre visit planning completed.    Brooke Matthew RN

## 2022-10-03 NOTE — TELEPHONE ENCOUNTER
Pre assessment questions completed for upcoming Colonoscopy procedure scheduled on 10/7/22    COVID policy reviewed. Patient to complete rapid antigen test one to two days before their scheduled procedure. Patient to bring photo of the results when they come in for their procedure.    Reviewed procedural arrival time 0920 and facility location [facility].    Designated  policy reviewed. Instructed to have someone stay 6 hours post procedure.     Reviewed Colonoscopy prep instructions. No fiber/iron supplements or foods that contain nuts/seeds 7 days prior to procedure.     Anticoagulation/blood thinners? None    Electronic implanted devices? None    Patient verbalized understanding and had no questions or concerns at this time.    Brooke Matthew RN

## 2022-10-06 ENCOUNTER — TELEPHONE (OUTPATIENT)
Dept: GASTROENTEROLOGY | Facility: CLINIC | Age: 52
End: 2022-10-06

## 2022-10-06 NOTE — TELEPHONE ENCOUNTER
Caller: Palmira Caraballo    Procedure: Colonoscopy    Date, Location, and Surgeon of Procedure Cancelled: 10/7/22, Mario FERRER    Ordering Provider:Ben Car MD    Reason for cancel (please be detailed, any staff messages or encounters to note?): positive covid test        Rescheduled: Y     If rescheduled:    Date: 12/9/22   [place] [facility]   Prep Resent: N (changes to prep?)   Covid Test Rescheduled: home   Note any change or update to original order/sedation:                   14

## 2022-11-19 ENCOUNTER — HEALTH MAINTENANCE LETTER (OUTPATIENT)
Age: 52
End: 2022-11-19

## 2022-12-05 NOTE — TELEPHONE ENCOUNTER
Pre assessment questions completed for upcoming colonoscopy  procedure scheduled on 12.9.2022    COVID policy reviewed.     Reviewed procedural arrival time 1030 and facility location [facility]; [address] [place] [address], [address] Floor, [place] [address]    Designated  policy reviewed. Instructed to have someone stay 6 hours post procedure.     Anticoagulation/blood thinners? No    Electronic implanted devices? No    Diabetic? No    Reviewed procedure prep instructions.     Patient verbalized understanding and had no questions or concerns at this time.    Zina Benson RN

## 2022-12-09 ENCOUNTER — HOSPITAL ENCOUNTER (OUTPATIENT)
Facility: AMBULATORY SURGERY CENTER | Age: 52
Discharge: HOME | End: 2022-12-09
Attending: INTERNAL MEDICINE | Admitting: INTERNAL MEDICINE
Payer: COMMERCIAL

## 2022-12-09 VITALS
RESPIRATION RATE: 12 BRPM | SYSTOLIC BLOOD PRESSURE: 122 MMHG | BODY MASS INDEX: 21.97 KG/M2 | TEMPERATURE: 97.5 F | OXYGEN SATURATION: 100 % | DIASTOLIC BLOOD PRESSURE: 76 MMHG | HEIGHT: 67 IN | HEART RATE: 62 BPM | WEIGHT: 140 LBS

## 2022-12-09 LAB
COLONOSCOPY: NORMAL
HCG UR QL: NEGATIVE
INTERNAL QC OK POCT: NORMAL
POCT KIT EXPIRATION DATE: NORMAL
POCT KIT LOT NUMBER: NORMAL

## 2022-12-09 PROCEDURE — 81025 URINE PREGNANCY TEST: CPT | Performed by: PATHOLOGY

## 2022-12-09 PROCEDURE — 88305 TISSUE EXAM BY PATHOLOGIST: CPT | Mod: 26 | Performed by: PATHOLOGY

## 2022-12-09 PROCEDURE — 45385 COLONOSCOPY W/LESION REMOVAL: CPT | Mod: 33

## 2022-12-09 PROCEDURE — 88305 TISSUE EXAM BY PATHOLOGIST: CPT | Mod: TC | Performed by: INTERNAL MEDICINE

## 2022-12-09 RX ORDER — ONDANSETRON 2 MG/ML
4 INJECTION INTRAMUSCULAR; INTRAVENOUS
Status: DISCONTINUED | OUTPATIENT
Start: 2022-12-09 | End: 2022-12-10 | Stop reason: HOSPADM

## 2022-12-09 RX ORDER — SODIUM CHLORIDE, SODIUM LACTATE, POTASSIUM CHLORIDE, CALCIUM CHLORIDE 600; 310; 30; 20 MG/100ML; MG/100ML; MG/100ML; MG/100ML
INJECTION, SOLUTION INTRAVENOUS CONTINUOUS
Status: DISCONTINUED | OUTPATIENT
Start: 2022-12-09 | End: 2022-12-10 | Stop reason: HOSPADM

## 2022-12-09 RX ORDER — NALOXONE HYDROCHLORIDE 0.4 MG/ML
0.2 INJECTION, SOLUTION INTRAMUSCULAR; INTRAVENOUS; SUBCUTANEOUS
Status: CANCELLED | OUTPATIENT
Start: 2022-12-09

## 2022-12-09 RX ORDER — NALOXONE HYDROCHLORIDE 0.4 MG/ML
0.4 INJECTION, SOLUTION INTRAMUSCULAR; INTRAVENOUS; SUBCUTANEOUS
Status: CANCELLED | OUTPATIENT
Start: 2022-12-09

## 2022-12-09 RX ORDER — ONDANSETRON 4 MG/1
4 TABLET, ORALLY DISINTEGRATING ORAL EVERY 6 HOURS PRN
Status: CANCELLED | OUTPATIENT
Start: 2022-12-09

## 2022-12-09 RX ORDER — ONDANSETRON 2 MG/ML
4 INJECTION INTRAMUSCULAR; INTRAVENOUS EVERY 6 HOURS PRN
Status: CANCELLED | OUTPATIENT
Start: 2022-12-09

## 2022-12-09 RX ORDER — FENTANYL CITRATE 50 UG/ML
INJECTION, SOLUTION INTRAMUSCULAR; INTRAVENOUS DAILY PRN
Status: DISCONTINUED | OUTPATIENT
Start: 2022-12-09 | End: 2022-12-09 | Stop reason: HOSPADM

## 2022-12-09 RX ORDER — FLUMAZENIL 0.1 MG/ML
0.2 INJECTION, SOLUTION INTRAVENOUS
Status: CANCELLED | OUTPATIENT
Start: 2022-12-09 | End: 2022-12-10

## 2022-12-09 RX ORDER — LIDOCAINE 40 MG/G
CREAM TOPICAL
Status: DISCONTINUED | OUTPATIENT
Start: 2022-12-09 | End: 2022-12-10 | Stop reason: HOSPADM

## 2022-12-09 RX ORDER — PROCHLORPERAZINE MALEATE 10 MG
10 TABLET ORAL EVERY 6 HOURS PRN
Status: CANCELLED | OUTPATIENT
Start: 2022-12-09

## 2022-12-09 RX ADMIN — SODIUM CHLORIDE, SODIUM LACTATE, POTASSIUM CHLORIDE, CALCIUM CHLORIDE: 600; 310; 30; 20 INJECTION, SOLUTION INTRAVENOUS at 11:02

## 2022-12-09 NOTE — LETTER
December 12, 2022      Palmira Caraballo  [address] [place] [address]        Dear ,    The pathology results returned from the polyp removed at your recent colonoscopy.    The polyp was completely benign with no added risk of colon cancer.      Current guidelines recommend that you undergo a follow-up colonoscopy in 10 years.    Sincerely,               Ashwin Martin MD   [facility], [place], ENDOSCOPY  [address] [place] [address]  Phone: [phone]

## 2022-12-09 NOTE — H&P
Palmira Caraballo  [id number]  female  52 year old      Reason for procedure/surgery: screening    Patient Active Problem List   Diagnosis     NO ACTIVE PROBLEMS     CARDIOVASCULAR SCREENING; LDL GOAL LESS THAN 160     Encounter for IUD insertion     Lichen sclerosus et atrophicus       Past Surgical History:    Past Surgical History:   Procedure Laterality Date     NO HISTORY OF SURGERY         Past Medical History:   Past Medical History:   Diagnosis Date     NO ACTIVE PROBLEMS        Social History:   Social History     Tobacco Use     Smoking status: Never     Smokeless tobacco: Never   Substance Use Topics     Alcohol use: Yes     Comment: Occasional cocktail/drink (0-1x per week)       Family History:   Family History   Problem Relation Age of Onset     Cerebrovascular Disease Mother      Hypertension Mother      Other - See Comments Mother         corticobasal degeneration and ataxia     Breast Cancer Maternal Grandmother      Aneurysm Maternal Grandfather         50s       Allergies:   Allergies   Allergen Reactions     No Known Allergies        Active Medications:   Current Outpatient Medications   Medication Sig Dispense Refill     bisacodyl (DULCOLAX) 5 MG EC tablet Take as directed. One day before exam take 2 tablets at 3 PM. Take 2 tablets at 11 PM. 4 tablet 0     clobetasol (TEMOVATE) 0.05 % external ointment Apply topically 2 times daily as need for itching.  Notify provider if symptoms don't resolve within 2 weeks. 45 g 4     polyethylene glycol (GOLYTELY) 236 g suspension Take as directed. One day before exam fill the jug with water. Cover and shake until well mixed. At 6 PM start drinking an 8oz glass of mixture every 15 minutes until jug is 1/2 empty. Store remainder in the refrigerator.  At 11 PM Start drinking the other half of the Golytely jug. Drink one 8-ounce glass every 15 minutes until the jug is empty. 4000 mL 0     levonorgestrel (MIRENA) 20 MCG/24HR IUD 1 each (20 mcg) by Intrauterine  "route once         Systemic Review:   CONSTITUTIONAL: NEGATIVE for fever, chills, change in weight  ENT/MOUTH: NEGATIVE for ear, mouth and throat problems  RESP: NEGATIVE for significant cough or SOB  CV: NEGATIVE for chest pain, palpitations or peripheral edema    Physical Examination:   Vital Signs: /72 (BP Location: Right arm)   Pulse 86   Temp 97.6  F (36.4  C) (Temporal)   Resp 18   Ht 1.702 m (5' 7\")   Wt 63.5 kg (140 lb)   SpO2 90%   BMI 21.93 kg/m    GENERAL: healthy, alert and no distress  NECK: no adenopathy, no asymmetry, masses, or scars  RESP: lungs clear to auscultation - no rales, rhonchi or wheezes  CV: regular rate and rhythm, normal S1 S2, no S3 or S4, no murmur, click or rub, no peripheral edema and peripheral pulses strong  ABDOMEN: soft, nontender, no hepatosplenomegaly, no masses and bowel sounds normal  MS: no gross musculoskeletal defects noted, no edema    Plan: Appropriate to proceed as scheduled.      Ashwin Martin MD  12/9/2022    PCP:  No primary care provider on file.    "

## 2022-12-12 LAB
PATH REPORT.COMMENTS IMP SPEC: NORMAL
PATH REPORT.COMMENTS IMP SPEC: NORMAL
PATH REPORT.FINAL DX SPEC: NORMAL
PATH REPORT.GROSS SPEC: NORMAL
PATH REPORT.MICROSCOPIC SPEC OTHER STN: NORMAL
PATH REPORT.RELEVANT HX SPEC: NORMAL
PHOTO IMAGE: NORMAL

## 2023-01-23 ENCOUNTER — TELEPHONE (OUTPATIENT)
Dept: DERMATOLOGY | Facility: CLINIC | Age: 53
End: 2023-01-23
Payer: COMMERCIAL

## 2023-01-23 NOTE — TELEPHONE ENCOUNTER
Lvm 2nd attempt sent my chart msg for patient to call and schedule her return visit with  in Derm. [phone]

## 2023-08-18 ENCOUNTER — ANCILLARY PROCEDURE (OUTPATIENT)
Dept: MAMMOGRAPHY | Facility: CLINIC | Age: 53
End: 2023-08-18
Payer: COMMERCIAL

## 2023-08-18 ENCOUNTER — OFFICE VISIT (OUTPATIENT)
Dept: OBGYN | Facility: CLINIC | Age: 53
End: 2023-08-18
Payer: COMMERCIAL

## 2023-08-18 VITALS
BODY MASS INDEX: 25.11 KG/M2 | HEIGHT: 67 IN | DIASTOLIC BLOOD PRESSURE: 82 MMHG | WEIGHT: 160 LBS | HEART RATE: 51 BPM | OXYGEN SATURATION: 99 % | SYSTOLIC BLOOD PRESSURE: 124 MMHG

## 2023-08-18 DIAGNOSIS — L90.0 LICHEN SCLEROSUS ET ATROPHICUS: ICD-10-CM

## 2023-08-18 DIAGNOSIS — Z12.31 VISIT FOR SCREENING MAMMOGRAM: ICD-10-CM

## 2023-08-18 DIAGNOSIS — Z00.00 ANNUAL PHYSICAL EXAM: Primary | ICD-10-CM

## 2023-08-18 PROCEDURE — 87624 HPV HI-RISK TYP POOLED RSLT: CPT | Performed by: OBSTETRICS & GYNECOLOGY

## 2023-08-18 PROCEDURE — 99396 PREV VISIT EST AGE 40-64: CPT | Performed by: OBSTETRICS & GYNECOLOGY

## 2023-08-18 PROCEDURE — 77063 BREAST TOMOSYNTHESIS BI: CPT | Mod: TC | Performed by: STUDENT IN AN ORGANIZED HEALTH CARE EDUCATION/TRAINING PROGRAM

## 2023-08-18 PROCEDURE — G0145 SCR C/V CYTO,THINLAYER,RESCR: HCPCS | Performed by: OBSTETRICS & GYNECOLOGY

## 2023-08-18 PROCEDURE — 77067 SCR MAMMO BI INCL CAD: CPT | Mod: TC | Performed by: STUDENT IN AN ORGANIZED HEALTH CARE EDUCATION/TRAINING PROGRAM

## 2023-08-18 RX ORDER — CLOBETASOL PROPIONATE 0.5 MG/G
OINTMENT TOPICAL 2 TIMES DAILY
Qty: 45 G | Refills: 4 | Status: SHIPPED | OUTPATIENT
Start: 2023-08-18

## 2023-08-18 NOTE — PROGRESS NOTES
Palmira is a 53 year old  female who presents for annual exam.     Menses are rare and variable lasting  0  days.  Menses flow: absent.  No LMP recorded. (Menstrual status: IUD).. Using IUD for contraception.  She is not currently considering pregnancy.  Besides routine health maintenance, she has no other health concerns today .  Night sweats in the last year, but now much better.  None in the last 3 months.  Not sure if she's been through menopause or not.  Has Mirena since 2021 and doesn't get periods.  Some pain with intercourse.  Uses steroid ointment very intermittently, only for a couple days at a time.  GYNECOLOGIC HISTORY:  Menarche: 18  Age at first intercourse: 20 Number of lifetime partners: 1  Palmira is sexually active with 1male partner(s) and is currently in monogamous relationship with boyfriend.    History sexually transmitted infections:No STD history  STI testing offered?  Declined  FELIX exposure: No  History of abnormal Pap smear: NO - age 30-65 PAP every 5 years with negative HPV co-testing recommended  Family history of breast CA: Yes (Please explain): m grandmother  Family history of uterine/ovarian CA: No    Family history of colon CA: No    HEALTH MAINTENANCE:  Cholesterol: (  Cholesterol   Date Value Ref Range Status   2022 178 <200 mg/dL Final   2021 191 <200 mg/dL Final   2010 142 0 - 200 mg/dL Final     Comment:     LDL Cholesterol is the primary guide to therapy.   The NCEP recommends further evaluation of: patients with cholesterol <200   mg/dL   if additional risk factors are present, cholesterol >240 mg/dL, triglycerides   >150 mg/dL, or HDL <40 mg/dL.    History of abnormal lipids: No  Mammo: 2022 . History of abnormal Mammo: No.  Regular Self Breast Exams: Yes  Calcium/Vitamin D intake: source:  dairy, dietary supplement(s) Adequate? Yes  TSH: (  TSH   Date Value Ref Range Status   2022 4.90 (H) 0.40 - 4.00 mU/L Final    )  Pap; (  Lab Results    Component Value Date    PAP NIL 2018    PAP NIL 2015    PAP NIL 2012    )    HISTORY:  OB History    Para Term  AB Living   3 3 3 0 0 3   SAB IAB Ectopic Multiple Live Births   0 0 0 0 3      # Outcome Date GA Lbr Franc/2nd Weight Sex Delivery Anes PTL Lv   3 Term 05 40w0d  3.232 kg (7 lb 2 oz) F    JOSEFA      Name: Josefa   2 Term 10/11/02 40w0d  3.674 kg (8 lb 1.6 oz) F    JOSEFA      Name: Mindy   1 Term 00 40w0d  2.771 kg (6 lb 1.8 oz) F    JOSEFA      Name: Prudence     Past Medical History:   Diagnosis Date    NO ACTIVE PROBLEMS      Past Surgical History:   Procedure Laterality Date    COLONOSCOPY N/A 2022    Procedure: COLONOSCOPY, WITH POLYPECTOMY;  Surgeon: Ashwin Mcmahon MD;  Location: [facility] OR    NO HISTORY OF SURGERY       Family History   Problem Relation Age of Onset    Cerebrovascular Disease Mother     Hypertension Mother     Other - See Comments Mother         corticobasal degeneration and ataxia    Breast Cancer Maternal Grandmother     Aneurysm Maternal Grandfather         50s     Social History     Socioeconomic History    Marital status:      Spouse name: None    Number of children: None    Years of education: None    Highest education level: None   Tobacco Use    Smoking status: Never    Smokeless tobacco: Never   Substance and Sexual Activity    Alcohol use: Yes     Comment: Occasional cocktail/drink (0-1x per week)    Drug use: No    Sexual activity: Yes     Partners: Male     Birth control/protection: I.U.D.     Comment: Mirena   Social History Narrative    Caffeine intake/servings daily - 0    Calcium intake/servings daily - 2    Exercise 3 times weekly - describe cardio, pilates    Sunscreen used - Yes    Seatbelts used - Yes    Guns stored in the home - No    Self Breast Exam - not often    Pap test up to date -  Yes 8/12/10 NIL    Eye exam up to date -  No    Dental exam up to date -  Yes    DEXA scan up to date -  Not  "Applicable    Flex Sig/Colonoscopy up to date -  Not Applicable    Mammography up to date -  No    Immunizations reviewed and up to date - Yes tdap 7/14/2009    Abuse: Current or Past (Physical, Sexual or Emotional) - No    Do you feel safe in your environment - Yes    Do you cope well with stress - Yes    Do you suffer from insomnia - No    Last updated by: Mela Todd MA.                 Social Determinants of Health     Intimate Partner Violence: Not At Risk (7/13/2022)    Humiliation, Afraid, Rape, and Kick questionnaire     Fear of Current or Ex-Partner: No     Emotionally Abused: No     Physically Abused: No     Sexually Abused: No       Current Outpatient Medications:     clobetasol (TEMOVATE) 0.05 % external ointment, Apply topically 2 times daily as need for itching.  Notify provider if symptoms don't resolve within 2 weeks., Disp: 45 g, Rfl: 4    levonorgestrel (MIRENA) 20 MCG/24HR IUD, 1 each (20 mcg) by Intrauterine route once, Disp: , Rfl:      Allergies   Allergen Reactions    No Known Allergies        Past medical, surgical, social and family history were reviewed and updated in EPIC.    ROS:   C:     NEGATIVE for fever, chills, change in weight  I:       NEGATIVE for worrisome rashes, moles or lesions  E:     NEGATIVE for vision changes or irritation  E/M: NEGATIVE for ear, mouth and throat problems  R:     NEGATIVE for significant cough or SOB  CV:   NEGATIVE for chest pain, palpitations or peripheral edema  GI:     NEGATIVE for nausea, abdominal pain, heartburn, or change in bowel habits  :   NEGATIVE for frequency, dysuria, hematuria, vaginal discharge, or irregular bleeding  M:     NEGATIVE for significant arthralgias or myalgia  N:      NEGATIVE for weakness, dizziness or paresthesias  E:      NEGATIVE for temperature intolerance, skin/hair changes  P:      NEGATIVE for changes in mood or affect.    EXAM:  /82   Pulse 51   Ht 1.702 m (5' 7\")   Wt 72.6 kg (160 lb)   SpO2 99%   " BMI 25.06 kg/m     BMI: Body mass index is 25.06 kg/m .  Constitutional: healthy, alert and no distress  Head: Normocephalic. No masses, lesions, tenderness or abnormalities  Neck: Neck supple. Trachea midline. No adenopathy. Thyroid symmetric, normal size.   Cardiovascular: RRR.   Respiratory: Negative.   Breast: Breasts reveal mild symmetric fibrocystic densities, but there are no dominant, discrete, fixed or suspicious masses found.  Gastrointestinal: Abdomen soft, non-tender, non-distended. No masses, organomegaly.  :  Vulva:  No external lesions, normal female hair distribution, no inguinal adenopathy.   Findings consistent with lichen sclerosis at posterior fourchette and clitoris.  Urethra:  Midline, non-tender, well supported, no discharge  Vagina:  Moist, pink, no abnormal discharge, no lesions.  Rectal Exam: deferred  Musculoskeletal: extremities normal  Skin: no suspicious lesions or rashes  Psychiatric: Affect appropriate, cooperative,mentation appears normal.     COUNSELING:   Reviewed preventive health counseling, as reflected in patient instructions  Special attention given to:        (Ernestina)menopause management   reports that she has never smoked. She has never used smokeless tobacco.    Body mass index is 25.06 kg/m .    FRAX Risk Assessment    ASSESSMENT:  53 year old female with satisfactory annual exam  (Z00.00) Annual physical exam  (primary encounter diagnosis)  Comment: Pap updated  Plan: Pap screen with HPV - recommended age 30 - 65         years, HPV Hold (Lab Only), HPV High Risk Types        DNA Cervical            (L90.0) Lichen sclerosus et atrophicus  Comment: No findings  of atrophy on exam, so don't feel there will be significant benefit from topical estrogen.  Recommended completing course of clobetasol to really manage lichen sclerosis and can then take a break until symptoms return.  Think these short courses were just not long enough to quiet things for longer time period.  Plan:  clobetasol (TEMOVATE) 0.05 % external ointment

## 2023-08-23 LAB
BKR LAB AP GYN ADEQUACY: NORMAL
BKR LAB AP GYN INTERPRETATION: NORMAL
BKR LAB AP HPV REFLEX: NORMAL
BKR LAB AP PREVIOUS ABNORMAL: NORMAL
PATH REPORT.COMMENTS IMP SPEC: NORMAL
PATH REPORT.COMMENTS IMP SPEC: NORMAL
PATH REPORT.RELEVANT HX SPEC: NORMAL

## 2023-08-24 LAB
HUMAN PAPILLOMA VIRUS 16 DNA: NEGATIVE
HUMAN PAPILLOMA VIRUS 18 DNA: NEGATIVE
HUMAN PAPILLOMA VIRUS FINAL DIAGNOSIS: NORMAL
HUMAN PAPILLOMA VIRUS OTHER HR: NEGATIVE

## 2023-12-12 ASSESSMENT — ANXIETY QUESTIONNAIRES
1. FEELING NERVOUS, ANXIOUS, OR ON EDGE: NOT AT ALL
5. BEING SO RESTLESS THAT IT IS HARD TO SIT STILL: NOT AT ALL
IF YOU CHECKED OFF ANY PROBLEMS ON THIS QUESTIONNAIRE, HOW DIFFICULT HAVE THESE PROBLEMS MADE IT FOR YOU TO DO YOUR WORK, TAKE CARE OF THINGS AT HOME, OR GET ALONG WITH OTHER PEOPLE: NOT DIFFICULT AT ALL
3. WORRYING TOO MUCH ABOUT DIFFERENT THINGS: NOT AT ALL
1. FEELING NERVOUS, ANXIOUS, OR ON EDGE: NOT AT ALL
4. TROUBLE RELAXING: NOT AT ALL
GAD7 TOTAL SCORE: 0
2. NOT BEING ABLE TO STOP OR CONTROL WORRYING: NOT AT ALL
GAD7 TOTAL SCORE: 0
7. FEELING AFRAID AS IF SOMETHING AWFUL MIGHT HAPPEN: NOT AT ALL
7. FEELING AFRAID AS IF SOMETHING AWFUL MIGHT HAPPEN: NOT AT ALL
2. NOT BEING ABLE TO STOP OR CONTROL WORRYING: NOT AT ALL
6. BECOMING EASILY ANNOYED OR IRRITABLE: NOT AT ALL
5. BEING SO RESTLESS THAT IT IS HARD TO SIT STILL: NOT AT ALL
3. WORRYING TOO MUCH ABOUT DIFFERENT THINGS: NOT AT ALL
IF YOU CHECKED OFF ANY PROBLEMS ON THIS QUESTIONNAIRE, HOW DIFFICULT HAVE THESE PROBLEMS MADE IT FOR YOU TO DO YOUR WORK, TAKE CARE OF THINGS AT HOME, OR GET ALONG WITH OTHER PEOPLE: NOT DIFFICULT AT ALL
4. TROUBLE RELAXING: NOT AT ALL
6. BECOMING EASILY ANNOYED OR IRRITABLE: NOT AT ALL

## 2023-12-13 ENCOUNTER — VIRTUAL VISIT (OUTPATIENT)
Dept: INTERNAL MEDICINE | Facility: CLINIC | Age: 53
End: 2023-12-13
Payer: COMMERCIAL

## 2023-12-13 DIAGNOSIS — Z00.00 VISIT FOR PREVENTIVE HEALTH EXAMINATION: ICD-10-CM

## 2023-12-13 DIAGNOSIS — Z00.00 ENCOUNTER FOR PREVENTIVE HEALTH EXAMINATION: Primary | ICD-10-CM

## 2023-12-13 PROCEDURE — 99207 PR NO CHARGE LOS: CPT

## 2023-12-13 RX ORDER — MULTIPLE VITAMINS W/ MINERALS TAB 9MG-400MCG
1 TAB ORAL DAILY
COMMUNITY

## 2023-12-13 NOTE — PROGRESS NOTES
Health Maintenance:  Do you have a PCP? No  When was your last visit with your PCP?   When was your last eye exam? 6 months  Have you ever had a colonoscopy? Yes   If yes, when? 12/22  Have you ever had any polyps removed? No    If Female: (25 years old+) When was your last pap smear ? 8/18/23   Have you ever had abnormal pap smear results? No    (40 years old+) When was your last mammogram? 8/18/23     As part of your visit we will set up a DEXA scan which will measure your body composition. We have a few questions that need to be answered before we can schedule this scan:   What is your approximate weight? 150   Have you ever had a DEXA scan within the past 2 years? Yes   Will you have any other imaging studies with contrast (x-ray, CT scan) within 7 days of this appointment? No   Have you had any spine or hip surgery? No   Do you take any vitamins that contain calcium or antacids with calcium? Yes    If yes, stop taking 24 hours prior to visit.     Goals for the Visit:  Thorough Comprehensive Preventive Exam    Pertinent past Medical/Family and Social HX:   Pertinent sx that desire are addressed with this visit:     Answers submitted by the patient for this visit:  ARLET-7 (Submitted on 12/12/2023)  ARLET 7 TOTAL SCORE: 0      Instructions prior to appointment:   1. Fast beginning at 10 pm for lab appointment  2. If your preventive care assessment package includes a Fitness Assessment, please bring athletic shoes. Complementary [facility] fitness attire is provided and yours to keep.  3. If eye exam, eyes may be dilated, it will last 4-6 hours, may want to bring sunglasses.   4. May bring laptop or other work materials for use during downtime.   5. You will receive an email about 3 days prior to your visit with a final itinerary, menu selections for the complementary breakfast and lunch and instructions for the visit.     Complimentary  Parking provided. Drop off car in front of [facility], take the patient elevators to the [facility]. When you enter in the lobby, identify yourself as an [facility] [atient and you will be escorted up to the clinic.   If questions arise prior to your appointment please contact the clinic at [phone].

## 2023-12-18 ENCOUNTER — OFFICE VISIT (OUTPATIENT)
Dept: GASTROENTEROLOGY | Facility: CLINIC | Age: 53
End: 2023-12-18

## 2023-12-18 ENCOUNTER — OFFICE VISIT (OUTPATIENT)
Dept: OPHTHALMOLOGY | Facility: CLINIC | Age: 53
End: 2023-12-18

## 2023-12-18 ENCOUNTER — OFFICE VISIT (OUTPATIENT)
Dept: DERMATOLOGY | Facility: CLINIC | Age: 53
End: 2023-12-18
Payer: COMMERCIAL

## 2023-12-18 ENCOUNTER — OFFICE VISIT (OUTPATIENT)
Dept: INTERNAL MEDICINE | Facility: CLINIC | Age: 53
End: 2023-12-18

## 2023-12-18 ENCOUNTER — APPOINTMENT (OUTPATIENT)
Dept: INTERNAL MEDICINE | Facility: CLINIC | Age: 53
End: 2023-12-18

## 2023-12-18 ENCOUNTER — OFFICE VISIT (OUTPATIENT)
Dept: DERMATOLOGY | Facility: CLINIC | Age: 53
End: 2023-12-18

## 2023-12-18 ENCOUNTER — LAB (OUTPATIENT)
Dept: INTERNAL MEDICINE | Facility: CLINIC | Age: 53
End: 2023-12-18

## 2023-12-18 ENCOUNTER — OFFICE VISIT (OUTPATIENT)
Dept: AUDIOLOGY | Facility: CLINIC | Age: 53
End: 2023-12-18

## 2023-12-18 ENCOUNTER — ANCILLARY PROCEDURE (OUTPATIENT)
Dept: BONE DENSITY | Facility: CLINIC | Age: 53
End: 2023-12-18

## 2023-12-18 ENCOUNTER — OFFICE VISIT (OUTPATIENT)
Dept: PULMONOLOGY | Facility: CLINIC | Age: 53
End: 2023-12-18

## 2023-12-18 VITALS
RESPIRATION RATE: 16 BRPM | HEIGHT: 67 IN | TEMPERATURE: 98.3 F | BODY MASS INDEX: 25.11 KG/M2 | SYSTOLIC BLOOD PRESSURE: 109 MMHG | HEART RATE: 63 BPM | DIASTOLIC BLOOD PRESSURE: 71 MMHG | WEIGHT: 160 LBS | OXYGEN SATURATION: 97 %

## 2023-12-18 DIAGNOSIS — L82.1 SEBORRHEIC KERATOSES: ICD-10-CM

## 2023-12-18 DIAGNOSIS — L81.4 SOLAR LENTIGO: ICD-10-CM

## 2023-12-18 DIAGNOSIS — Z00.00 VISIT FOR PREVENTIVE HEALTH EXAMINATION: ICD-10-CM

## 2023-12-18 DIAGNOSIS — Z00.00 ENCOUNTER FOR PREVENTIVE HEALTH EXAMINATION: ICD-10-CM

## 2023-12-18 DIAGNOSIS — Z71.82 EXERCISE COUNSELING: Primary | ICD-10-CM

## 2023-12-18 DIAGNOSIS — H52.4 PRESBYOPIA OF BOTH EYES: ICD-10-CM

## 2023-12-18 DIAGNOSIS — D18.01 CHERRY ANGIOMA: ICD-10-CM

## 2023-12-18 DIAGNOSIS — Z01.10 EXAMINATION OF EARS AND HEARING: Primary | ICD-10-CM

## 2023-12-18 DIAGNOSIS — B07.9 VERRUCA VULGARIS: Primary | ICD-10-CM

## 2023-12-18 DIAGNOSIS — H52.13 MYOPIA, BILATERAL: Primary | ICD-10-CM

## 2023-12-18 DIAGNOSIS — D22.9 MULTIPLE BENIGN NEVI: ICD-10-CM

## 2023-12-18 DIAGNOSIS — Z12.83 SKIN CANCER SCREENING: Primary | ICD-10-CM

## 2023-12-18 DIAGNOSIS — Z83.518 FAMILY HISTORY OF RETINAL DETACHMENT: ICD-10-CM

## 2023-12-18 DIAGNOSIS — H04.123 DRY EYES, BILATERAL: ICD-10-CM

## 2023-12-18 LAB
ALP SERPL-CCNC: 51 U/L (ref 40–150)
ALT SERPL W P-5'-P-CCNC: 23 U/L (ref 0–50)
BASOPHILS # BLD AUTO: 0 10E3/UL (ref 0–0.2)
BASOPHILS NFR BLD AUTO: 1 %
CHOLEST SERPL-MCNC: 183 MG/DL
CREAT SERPL-MCNC: 0.75 MG/DL (ref 0.51–0.95)
EGFRCR SERPLBLD CKD-EPI 2021: >90 ML/MIN/1.73M2
EOSINOPHIL # BLD AUTO: 0.1 10E3/UL (ref 0–0.7)
EOSINOPHIL NFR BLD AUTO: 2 %
ERYTHROCYTE [DISTWIDTH] IN BLOOD BY AUTOMATED COUNT: 12.3 % (ref 10–15)
FASTING STATUS PATIENT QL REPORTED: ABNORMAL
FASTING STATUS PATIENT QL REPORTED: NORMAL
GLUCOSE SERPL-MCNC: 102 MG/DL (ref 70–99)
HCT VFR BLD AUTO: 39.9 % (ref 35–47)
HDLC SERPL-MCNC: 78 MG/DL
HGB BLD-MCNC: 13.3 G/DL (ref 11.7–15.7)
HIV 1+2 AB+HIV1 P24 AG SERPL QL IA: NONREACTIVE
HOLD SPECIMEN: NORMAL
HOLD SPECIMEN: NORMAL
IMM GRANULOCYTES # BLD: 0 10E3/UL
IMM GRANULOCYTES NFR BLD: 0 %
LDLC SERPL CALC-MCNC: 97 MG/DL
LYMPHOCYTES # BLD AUTO: 1.3 10E3/UL (ref 0.8–5.3)
LYMPHOCYTES NFR BLD AUTO: 32 %
MCH RBC QN AUTO: 28.1 PG (ref 26.5–33)
MCHC RBC AUTO-ENTMCNC: 33.3 G/DL (ref 31.5–36.5)
MCV RBC AUTO: 84 FL (ref 78–100)
MONOCYTES # BLD AUTO: 0.3 10E3/UL (ref 0–1.3)
MONOCYTES NFR BLD AUTO: 6 %
NEUTROPHILS # BLD AUTO: 2.4 10E3/UL (ref 1.6–8.3)
NEUTROPHILS NFR BLD AUTO: 59 %
NONHDLC SERPL-MCNC: 105 MG/DL
NRBC # BLD AUTO: 0 10E3/UL
NRBC BLD AUTO-RTO: 0 /100
PLATELET # BLD AUTO: 192 10E3/UL (ref 150–450)
RBC # BLD AUTO: 4.74 10E6/UL (ref 3.8–5.2)
TRIGL SERPL-MCNC: 41 MG/DL
TSH SERPL DL<=0.005 MIU/L-ACNC: 3.78 UIU/ML (ref 0.3–4.2)
VIT D+METAB SERPL-MCNC: 36 NG/ML (ref 20–50)
WBC # BLD AUTO: 4.1 10E3/UL (ref 4–11)

## 2023-12-18 PROCEDURE — 99213 OFFICE O/P EST LOW 20 MIN: CPT | Mod: 25 | Performed by: PHYSICIAN ASSISTANT

## 2023-12-18 PROCEDURE — 82947 ASSAY GLUCOSE BLOOD QUANT: CPT | Performed by: PATHOLOGY

## 2023-12-18 PROCEDURE — 84443 ASSAY THYROID STIM HORMONE: CPT | Performed by: PATHOLOGY

## 2023-12-18 PROCEDURE — 92567 TYMPANOMETRY: CPT | Performed by: AUDIOLOGIST

## 2023-12-18 PROCEDURE — 84460 ALANINE AMINO (ALT) (SGPT): CPT | Performed by: PATHOLOGY

## 2023-12-18 PROCEDURE — 92557 COMPREHENSIVE HEARING TEST: CPT | Performed by: AUDIOLOGIST

## 2023-12-18 PROCEDURE — 82306 VITAMIN D 25 HYDROXY: CPT | Performed by: INTERNAL MEDICINE

## 2023-12-18 PROCEDURE — 82565 ASSAY OF CREATININE: CPT | Performed by: PATHOLOGY

## 2023-12-18 PROCEDURE — 99207 PR NO CHARGE LOS: CPT

## 2023-12-18 PROCEDURE — 36415 COLL VENOUS BLD VENIPUNCTURE: CPT | Performed by: PATHOLOGY

## 2023-12-18 PROCEDURE — 77080 DXA BONE DENSITY AXIAL: CPT | Performed by: INTERNAL MEDICINE

## 2023-12-18 PROCEDURE — 93000 ELECTROCARDIOGRAM COMPLETE: CPT | Performed by: INTERNAL MEDICINE

## 2023-12-18 PROCEDURE — 99207 PR NO BILLABLE SERVICE THIS VISIT: CPT | Performed by: DIETITIAN, REGISTERED

## 2023-12-18 PROCEDURE — 80061 LIPID PANEL: CPT | Performed by: PATHOLOGY

## 2023-12-18 PROCEDURE — 84075 ASSAY ALKALINE PHOSPHATASE: CPT | Performed by: PATHOLOGY

## 2023-12-18 PROCEDURE — 94375 RESPIRATORY FLOW VOLUME LOOP: CPT | Performed by: INTERNAL MEDICINE

## 2023-12-18 PROCEDURE — 92004 COMPRE OPH EXAM NEW PT 1/>: CPT | Performed by: OPHTHALMOLOGY

## 2023-12-18 PROCEDURE — 99396 PREV VISIT EST AGE 40-64: CPT | Performed by: INTERNAL MEDICINE

## 2023-12-18 PROCEDURE — 17110 DESTRUCTION B9 LES UP TO 14: CPT | Performed by: PHYSICIAN ASSISTANT

## 2023-12-18 PROCEDURE — 99000 SPECIMEN HANDLING OFFICE-LAB: CPT | Performed by: PATHOLOGY

## 2023-12-18 PROCEDURE — 96999 UNLISTED SPEC DERM SVC/PX: CPT | Performed by: INTERNAL MEDICINE

## 2023-12-18 PROCEDURE — 85025 COMPLETE CBC W/AUTO DIFF WBC: CPT | Performed by: PATHOLOGY

## 2023-12-18 PROCEDURE — 87389 HIV-1 AG W/HIV-1&-2 AB AG IA: CPT | Performed by: INTERNAL MEDICINE

## 2023-12-18 ASSESSMENT — CONF VISUAL FIELD
OD_INFERIOR_TEMPORAL_RESTRICTION: 0
OD_NORMAL: 1
OS_INFERIOR_TEMPORAL_RESTRICTION: 0
OD_SUPERIOR_TEMPORAL_RESTRICTION: 0
OS_NORMAL: 1
METHOD: COUNTING FINGERS
OS_SUPERIOR_TEMPORAL_RESTRICTION: 0
OD_SUPERIOR_NASAL_RESTRICTION: 0
OS_SUPERIOR_NASAL_RESTRICTION: 0
OS_INFERIOR_NASAL_RESTRICTION: 0
OD_INFERIOR_NASAL_RESTRICTION: 0

## 2023-12-18 ASSESSMENT — TONOMETRY
OD_IOP_MMHG: 17
IOP_METHOD: ICARE
OS_IOP_MMHG: 15

## 2023-12-18 ASSESSMENT — VISUAL ACUITY
CORRECTION_TYPE: GLASSES
OD_CC: 20/20
OS_CC: 20/20
METHOD: SNELLEN - LINEAR

## 2023-12-18 ASSESSMENT — EXTERNAL EXAM - RIGHT EYE: OD_EXAM: NORMAL

## 2023-12-18 ASSESSMENT — PAIN SCALES - GENERAL
PAINLEVEL: NO PAIN (0)

## 2023-12-18 ASSESSMENT — CUP TO DISC RATIO
OD_RATIO: 0.2
OS_RATIO: 0.1

## 2023-12-18 ASSESSMENT — EXTERNAL EXAM - LEFT EYE: OS_EXAM: NORMAL

## 2023-12-18 ASSESSMENT — SLIT LAMP EXAM - LIDS
COMMENTS: NORMAL
COMMENTS: NORMAL

## 2023-12-18 NOTE — PROGRESS NOTES
[facility] Dermatology Note  Encounter Date: Dec 18, 2023  Office Visit     Dermatology Problem List:  Last FBSE 12/18/23    0. Lesion to Monitor: NUB, R upper back. Favor inflammatory, but monitor for resolution.  - Photodocumentation 7/13/22  1.  Verruca vulgaris  - Left palm, s/p cryo 12/18/23  - L 3rd MTP palmar aspect, s/p cryo 7/13/22    FHx: Negative for skin cancers or skin conditions.  SHx: [employer]    ____________________________________________    Assessment & Plan:    # Skin cancer screening with multiple benign nevi and solar lentigines.  - ABCDEs: Counseled ABCDEs of melanoma: Asymmetry, Border (irregularity), Color (not uniform, changes in color), Diameter (greater than 6 mm which is about the size of a pencil eraser), and Evolving (any changes in preexisting moles).  - Sun protection: Counseled SPF30+ sunscreen, UPF clothing, sun avoidance, tanning bed avoidance.    # Cherry angiomas.  # Seborrheic keratoses.  # Dermatofibroma - left thigh.  - Benign, reassurance given.     # Verruca vulgaris - left palm.  - Cryotherapy performed today. See procedure note in separate encounter.     Procedures Performed:   See separate procedure note for this [facility] participant.    Follow-up: 1 year(s) in-person for FBSE, or earlier for new or changing lesions    Staff and Scribe:     Scribe Disclosure:   I, Annita Mena, am serving as a scribe to document services personally performed by Michaela Ramey PA-C based on data collection and the provider's statements to me.     Provider Disclosure:   The documentation recorded by the scribe accurately reflects the services I personally performed and the decisions made by me.    All risks, benefits and alternatives were discussed with patient.  Patient is in agreement and understands the assessment and plan.  All questions were answered.  Sun Screen Education was given.   Return to Clinic annually or sooner as needed.    Michaela Ramey PA-C   [facility]    ____________________________________________    CC: Skin Check (Palmira is here for a skin check and has no spots of concern.)    HPI:  Ms. Palmira Caraballo is a(n) 53 year old female who presents today as a return patient for a TBSE.    Last seen by Dr. Chandler on 7/13/22 for a TBSE. A verrucous papule on the left 3rd MTP palmar aspect was treated with cryotherapy. Dermoscopy showed no specific features of a very nonspecific pink thin papule on right upper back, for which the patient elected monitoring.    Today, she reports she is pretty good with sunscreen use and tries to stay out of the sun. She does have a wart on her left hand that did not resolve with cryotherapy from the last visit, but instead healed after using OTC treatment. She has a new wart in the same area today.    Patient is otherwise feeling well, without additional skin concerns.    Labs Reviewed:  N/A    Physical Exam:  Vitals: There were no vitals taken for this visit.  SKIN: Full skin, which includes the head/face, both arms, chest, back, abdomen,both legs, genitalia and/or groin buttocks, digits and/or nails, was examined.  - There is a firm tan/flesh colored papule that dimples with lateral pressure on the left thigh.   - There are dome-shaped bright red papules on the trunk and extremities.   - Multiple regular brown pigmented macules and papules are identified on the trunk and extremities.   - Scattered brown macules on sun exposed areas.  - There are waxy stuck-on tan-to-brown papules on the trunk and extremities.  - There is a 1 mm verrucous papule with thrombosed capillaries interrupting dermatoglyphics on the left palm.   - No other lesions of concern on areas examined.     Medications:  Current Outpatient Medications   Medication    clobetasol (TEMOVATE) 0.05 % external ointment    levonorgestrel (MIRENA) 20 MCG/24HR IUD    multivitamin w/minerals (MULTI-VITAMIN)  tablet     No current facility-administered medications for this visit.      Past Medical History:   Patient Active Problem List   Diagnosis    NO ACTIVE PROBLEMS    CARDIOVASCULAR SCREENING; LDL GOAL LESS THAN 160    Encounter for IUD insertion    Lichen sclerosus et atrophicus     Past Medical History:   Diagnosis Date    NO ACTIVE PROBLEMS         CC No referring provider defined for this encounter. on close of this encounter.

## 2023-12-18 NOTE — PATIENT INSTRUCTIONS
It was nice speaking with you today. Below are the nutrition recommendations we discussed at your visit.    Please let me know if you have any additional questions.    Nutrition Recommendations    Follow a low FODMAP diet for 3-4 weeks.  - Start with eliminating higher FODMAP foods for 3-4 weeks.   -Then after 3-4 weeks start adding those higher FODMAP foods back into diet. (see process for reintroducing below).    OR   If you prefer to do a partial lower FODMAP diet, then    -For Modified/partial lower FODMAP diet, review the list of high FODMAP foods and then eliminate several of those higher FODMAP foods that you eat most days of the week for 3-4 weeks.    After 3-4 weeks, to add higher FODMAP foods back into diet:    -Start by adding back in 1 higher fodmap food at a time by eating a small serving of that food each day for 3 days in a row OR you can gradually increase the portion size over the course of the 3 days. For example on day 1, can have 1/4 cup serving, then on day 2, can have 1/3 c and then on day 3, can have a 1/2 cup serving.    -If tolerated, then wait at least one day, and then add another higher FODMAP food back into diet for 3 days in a row and onward.     -If you prefer to reintroduce foods slowly, then you could add one food each week (depending on how quickly you'd like to re-introduce and also depending on if you have a return of symptoms that may linger for a day or so).    -If you notice any symptoms after adding back in a higher FODMAP food than can wait until symptoms improve before trying the next higher FODMAP food.     -Also if you notice a significant increase in symptoms after retrying the first day, you do not need to eat on additional days.    --Keep daily food and beverage journals and track all symptoms.    --Plan menus and meals ahead of time to help you stick to the elimination diet.    Here are some websites with low FODMAP  recipes:    Www.fodmapeveryday.com  Www.katescarlata.com  IBSfree.net    [employer] at www.[employer] sells ready made low FODMAP meals. Here is a coupon code for 60% off some meals. Code = 60off    [facility] is the [employer] that created and did the initial research for the low FODMAP diet. They have more information, an radha and food lists as well.     Ideally I recommend following up about 3-4 weeks after following the low FODMAP diet to discuss reintroduction, so if you would like to schedule a follow up visit, the scheduling number is [phone]. I can do virtual visits or in person visit in clinic on Mondays. These visits are separate from the [facility] program visits. Otherwise if you feel comfortable reintroducing based on the instruction above, you can as well. Feel free to send me a my chart message if you have any questions.    Thank you,    Isidra Bhatti, MS, RD, LD

## 2023-12-18 NOTE — NURSING NOTE
Chief Complaints and History of Present Illnesses   Patient presents with    Annual Eye Exam     Chief Complaint(s) and History of Present Illness(es)       Annual Eye Exam              Laterality: both eyes    Associated symptoms: Negative for redness, jaw claudication, photophobia and foreign body sensation    Treatments tried: no treatments    Pain scale: 0/10              Comments    Patient present for routine eye exam. Patient just got glasses one month ago. Patient has no complaints at this time.  ROSALIA Kelly December 18, 2023 8:08 AM

## 2023-12-18 NOTE — PROGRESS NOTES
See fitness action plan and patient instructions. Ask if she's performed more bucket-list items like the triathlon she did in 2023.

## 2023-12-18 NOTE — PROGRESS NOTES
CC: Patient is here today for cryotherapy of a verrucous papule on her left palm as [facility] Patient     Subjective and Objective: See separate visit note.    Procedure Note:  Cryotherapy procedure note, location(s): left palm. After verbal consent and discussion of risks and benefits including, but not limited to, dyspigmentation/scar, blister, and pain, 1 lesion(s) was(were) treated with 1-2 mm freeze border for 1-2 cycles with liquid nitrogen. Post cryotherapy instructions were provided.    Follow-up PRN, pending new or changing lesions.    Scribe Disclosure:   IAnnita, am serving as a scribe to document services personally performed by Michaela Ramey PA-C based on data collection and the provider's statements to me.     Provider Disclosure:   The documentation recorded by the scribe accurately reflects the services I personally performed and the decisions made by me.    All risks, benefits and alternatives were discussed with patient.  Patient is in agreement and understands the assessment and plan.  All questions were answered.  Sun Screen Education was given.   Return to Clinic as needed   Michaela Ramey PA-C   [facility]

## 2023-12-18 NOTE — LETTER
12/18/2023       RE: Palmira Caraballo  [address]  [place] [address]     Dear Colleague,    Thank you for referring your patient, Palmira Caraballo, to the [facility]MAT[facility] [place] at [facility]. Please see a copy of my visit note below.        [facility] Dermatology Note  Encounter Date: Dec 18, 2023  Office Visit     Dermatology Problem List:  Last FBSE 12/18/23    0. Lesion to Monitor: NUB, R upper back. Favor inflammatory, but monitor for resolution.  - Photodocumentation 7/13/22  1.  Verruca vulgaris  - Left palm, s/p cryo 12/18/23  - L 3rd MTP palmar aspect, s/p cryo 7/13/22    FHx: Negative for skin cancers or skin conditions.  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  - ABCDEs: Counseled ABCDEs of melanoma: Asymmetry, Border (irregularity), Color (not uniform, changes in color), Diameter (greater than 6 mm which is about the size of a pencil eraser), and Evolving (any changes in preexisting moles).  - Sun protection: Counseled SPF30+ sunscreen, UPF clothing, sun avoidance, tanning bed avoidance.    # Cherry angiomas.  # Seborrheic keratoses.  # Dermatofibroma - left thigh.  - Benign, reassurance given.     # Verruca vulgaris - left palm.  - Cryotherapy performed today. See procedure note in separate encounter.     Procedures Performed:   See separate procedure note for this [facility] participant.    Follow-up: 1 year(s) in-person for FBSE, or earlier for new or changing lesions    Staff and Scribe:     Scribe Disclosure:   Annita CHASE, am serving as a scribe to document services personally performed by Michaela Ramey PA-C based on data collection and the provider's statements to me.     Provider Disclosure:   The documentation recorded by the scribe accurately  reflects the services I personally performed and the decisions made by me.    All risks, benefits and alternatives were discussed with patient.  Patient is in agreement and understands the assessment and plan.  All questions were answered.  Sun Screen Education was given.   Return to Clinic annually or sooner as needed.   Michaela Ramey PA-C   [facility]    ____________________________________________    CC: Skin Check (Palmira is here for a skin check and has no spots of concern.)    HPI:  Ms. Palmira Caraballo is a(n) 53 year old female who presents today as a return patient for a TBSE.    Last seen by Dr. Chandler on 7/13/22 for a TBSE. A verrucous papule on the left 3rd MTP palmar aspect was treated with cryotherapy. Dermoscopy showed no specific features of a very nonspecific pink thin papule on right upper back, for which the patient elected monitoring.    Today, she reports she is pretty good with sunscreen use and tries to stay out of the sun. She does have a wart on her left hand that did not resolve with cryotherapy from the last visit, but instead healed after using OTC treatment. She has a new wart in the same area today.    Patient is otherwise feeling well, without additional skin concerns.    Labs Reviewed:  N/A    Physical Exam:  Vitals: There were no vitals taken for this visit.  SKIN: Full skin, which includes the head/face, both arms, chest, back, abdomen,both legs, genitalia and/or groin buttocks, digits and/or nails, was examined.  - There is a firm tan/flesh colored papule that dimples with lateral pressure on the left thigh.   - There are dome-shaped bright red papules on the trunk and extremities.   - Multiple regular brown pigmented macules and papules are identified on the trunk and extremities.   - Scattered brown macules on sun exposed areas.  - There are waxy stuck-on tan-to-brown papules on the trunk and extremities.  - There is a 1 mm verrucous papule  with thrombosed capillaries interrupting dermatoglyphics on the left palm.   - No other lesions of concern on areas examined.     Medications:  Current Outpatient Medications   Medication    clobetasol (TEMOVATE) 0.05 % external ointment    levonorgestrel (MIRENA) 20 MCG/24HR IUD    multivitamin w/minerals (MULTI-VITAMIN) tablet     No current facility-administered medications for this visit.      Past Medical History:   Patient Active Problem List   Diagnosis    NO ACTIVE PROBLEMS    CARDIOVASCULAR SCREENING; LDL GOAL LESS THAN 160    Encounter for IUD insertion    Lichen sclerosus et atrophicus     Past Medical History:   Diagnosis Date    NO ACTIVE PROBLEMS         CC No referring provider defined for this encounter. on close of this encounter.

## 2023-12-18 NOTE — LETTER
2023       RE: Palmira Caraballo  [address] [place] [address]     Dear Colleague,    Thank you for referring your patient, Palmira Caraballo, to the [facility] [place] at [facility]. Please see a copy of my visit note below.    History and Physical Examination     SUBJECTIVE: Chief concern: preventive health review.     Past Medical History:  1.   3 para 3, with normal spontaneous vaginal deliveries in 2000, 10/2002, and 2005.  2.  History of minimal subclinical hypothyroidism  3.  History of microscopic hematuria with negative evaluation at [facility], 10/2019  4.  History of narrow airway with snoring  5.  Raynaud's phenomenon  6.  Lichen sclerosis et atrophicus     Adverse Drug Reactions: None.     Current Medications:     Levonorgestrol, 20 mcg/24 hour IUD.  Clobetasol 0.05%, applied twice daily as needed for itching.  Daily multivitamin supplement      Habits:  Tobacco: Never  Alcohol: None since ; history of 1-2 servings per week  Caffeine: None.  Cannabis/Street drugs: Rare use of oral CBD for insomnia     Social History:  to Fredrick, whom she met in high school, and mother of 3 daughters: Prudence, age 22, who moved to [place] following her graduation from the [employer] and is currently job searching; Mindy, age 20, a selam in the [employer] at the [employer]; and Josefa, age 18, who attends the [employer].   Palmira was born in [place] and lived in [place] and [place] before moving to [place] at age 12, when her father was transferred to the headquarters of the [employer].  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  Away from work, she enjoys family activities, hosting dinner parties, attending theater, reading, and exercise.  She exercises for 40 minutes daily, following the [employer] program that includes aerobic and strength training.     Family History: Mother  in  at age 75 from complications of cortical basal degeneration and apraxia, with history of hypertension and TIA at age 65. Father has a history of colonic polyps at age 79.  A sister 2 years her senior has a history of colonic polyps.  A brother 4 years her selam is in good health.  Maternal grandmother with diagnosed with breast cancer in her 50s and  at age 90.  Maternal grandfather  from an unspecified aneurysm in his 50s.  Paternal grandmother  in her late 80s to early 90s.  Paternal grandfather  in his late 80s to 90s, with possible history of colon cancer.  Daughters are in good health.     Review of Systems: Over the past year, Palmira has developed symptoms she suspects represent perimenopause, including weight gain and vasomotor symptoms with heat intolerance and diaphoresis, especially at night; she sleeps for approximately 4 hours after going to bed, then awakens every hour with diaphoresis; she describes unchanged intermittent snoring, with occasional daytime somnolence.  She has not noted lymphadenopathy, unexplained weight loss, fever, or symptoms of infection.  She notes intolerance to use of alcohol, noting more pronounced flushing and mild dyspepsia; symptoms were not noted until the past year.  She notes intermittent abdominal bloating with nausea and gas which she suspects may be related in part to consumption of dairy products; symptoms have improved with elimination of milk but she notes that she continues to consume butter and cheese.  Fingers and toes are \"always cold\", without progression of symptoms previously attributed to Raynaud's phenomenon.  Palmira is followed regularly by an outside " "gynecologist; she indicates that cervical cancer screening was negative in 8/2023 at which time she underwent bilateral breast examination.  Colonoscopy was completed in 12/2022, with findings of a hyperplastic polyp versus lymphoid aggregate; 10-year follow-up was recommended at that time.  COVID (Pfizer) vaccinations administered on 3/13 and 4/3/2021; Covid (Moderna) boosters were administered on 10/29/2021 and 7/16/2022.  Tetanus (Td) was administered in 10/2019; Tdap was administered in 2009.  Recombinant zoster vaccination series was completed in 9/2022.  Influenza vaccination was administered earlier this season.  She believes that she has never completed a hepatitis B vaccination series.  Remainder of complete review of systems was negative.       OBJECTIVE:     Vital signs: Height 66.75 inches.  Weight 160 pounds.  Blood pressure 110/74 on average of 3 automated readings.  Heart rate 63.  Respiratory rate 16.  Temperature 98.3 degrees.  O2 saturation 97% on room air.    General: Alert, neatly dressed and groomed, in no acute distress.  HEENT: Atraumatic and normocephalic. Eyelids, pupils, and conjunctivae appeared normal. Lips, teeth and gums appear normal.  Oropharynx showed moist mucous membranes, without exudate or erythema. Somewhat \"crowded\" soft palate with relatively narrow airway.    Neck: Supple, without thyromegaly, mass, or bruit. No cervical or supraclavicular lymphadenopathy.  Back: No spinal or costovertebral angle tenderness.  Chest: Clear to auscultation and percussion. Normal respiratory effort.  Cardiovascular: No jugular venous distention. Regular rate and rhythm, normal S1, S2 without murmur.  Abdomen: Bowel sounds positive; soft, nontender, without rebound, guarding, hepatosplenomegaly or mass.  Extremities: No cyanosis or edema. Cool fingers, without pallor or cyanosis.  Normal pulses throughout.    Breasts: Examination was deferred; examination was completed by her gynecologist in the " "recent past.  Skin: Examination was deferred; full evaluation was completed earlier in day through dermatology clinic.  Neurologic: Cranial nerves II-XII were grossly intact. Sensory and motor examinations were normal. Normal gait.  Mini-cog score was 5/5.  Psychiatric: Alert and oriented ×3. Normal affect. Judgment and insight intact.  PHQ-2 score was 0.  ARLET-7 score 0.     Creatinine 0.75, alkaline phosphatase 51, ALT 23, cholesterol 183, HDL 78, LDL 97, triglycerides 41, cholesterol/HDL 2.3, TSH 3.78, 25-hydroxy vitamin D 36, glucose 102, white blood cell count 4100, hemoglobin 13.3, platelets 192,000, HIV nonreactive.     EKG showed sinus bradycardia.  Spirometry showed an FEV1 of 3.37, with an FVC of 4.02; readings were 126% and 121% of predicted values, respectively.    An audiology evaluation showed normal hearing bilaterally.     Preiliminary DEXA results showed normal bone density.      Body composition analysis showed 41.8% fat (80th percentile); body mass index was 25.4.  Reading from 7/2022 showed 35.9% fat (55th percentile).        ASSESSMENT:     1.  Perimenopausal symptoms.  She describes vasomotor symptoms with night sweats, without other suggestion of chronic infection, malignancy, or other cause for her symptoms.  We discussed options for management and my suggestion that she first exhaust non-pharmacologic methods for management, including continued caffeine avoidance of an alcohol; avoidance of hot/\"spicy\" foods; maintenance of cool ambient temperatures with use of fans or air conditioning as needed; and stress management.  She was advised that additional therapy would be considered in the event of progressive or persistent symptoms.     2.  Abdominal symptoms.  Palmira describes symptoms suggestive of lactose intolerance.  She has noted improvement with a modest reduction of lactose intake.  I recommended a trial of scrupulous avoidance of all lactose-containing foods; symptoms do not fully " "subside, she will consider additional food elimination with attention to FODMAPS.  She was advised of the role of lactase and alpha galactosidase supplements depending on the pattern noted.  I emphasized the importance of pursuing further evaluation if she is unable to isolate a specific trigger, in which event further evaluation including colonoscopy to exclude microscopic colitis should be considered.     3.  Snoring.  Associated with a narrow airway, with mild daytime somnolence.  We agreed that the sleep disruption she has noted with perimenopausal symptoms may be explaining her daytime somnolence.  Palmira was advised to ask her  to monitor her breathing during sleep and to pursue further evaluation if he observes loud snoring, gasping, or pauses in breathing during sleep.     4.  History of subclinical hypothyroidism.  Current TSH level is normal.  She describes weight gain that she attributes to perimenopausal changes.    5.  Raynaud's phenomenon.  Mild symptoms, currently not bothersome.  We reviewed the options available for treatment, and the importance of pursuing further evaluation if pain, ulceration, or more pronounced symptoms arise.    6.  Increased body fat percentage.  We discussed the risks associated with higher levels of body fat and reviewed strategies for reducing body fat and weight through modification of diet and exercise.      7.  Preventive care.  Using her cholesterol fractionation and blood pressure data, guidelines from the AHA/ACC estimate her 10-year risk of a vascular event at 0.6%, lower than the 0.9% \"optimal\" risk for her age/gender cohort.  Colonoscopy was completed in 12/2022, at which time 10-year follow-up was recommended.  To bring her immunization status up-to-date, I will recommend hepatitis B vaccination series and a COVID vaccination booster.  She was advised to use a vitamin D3 supplement, 50 mcg daily, while maintaining daily calcium intake of 1200 mg, " preferably from dietary sources.  We reviewed dietary strategies for reducing body fat weight as well as the roles of various types of exercise, including the time economy associated dull with high-intensity interval training in the fat-burning effects of strength training and subthreshold exercise.     PLAN: See above.     ~SRT    Again, thank you for allowing me to participate in the care of your patient.      Sincerely,    Ben Car MD

## 2023-12-18 NOTE — PATIENT INSTRUCTIONS

## 2023-12-18 NOTE — NURSING NOTE
Dermatology Rooming Note    Palmira Caraballo's goals for this visit include:   Chief Complaint   Patient presents with    Skin Check     Palmira is here for a skin check and has no spots of concern.     Mac Mcginnis, EMT

## 2023-12-18 NOTE — PATIENT INSTRUCTIONS
"Palmira,    It was great to see you again at [facility]. I was also impressed by your effort on the VO2max test: your results clearly represented your current fitness level. Your current routine has high frequency, sufficient duration, balance, and variety. In addition, your VO2max is almost exactly average for your age group while your  strength is above average. In short, you've made my job easy: keep up the great work. Nevertheless, I have a short list of small recommendations.    Resistance training    Even as the amount of weight you use for workouts may change with menopause, strive to intentionally lift heavier weights from time to time. Bone mineral density declines more quickly after menopause, and heavier weights are important to counteract such losses.    VO2max and cardiovascular workouts    Should you ever wish to perform a workout that targets VO2max more specifically than other training adaptations, choose [employer] workouts that are labeled as \"HIIT\" or another term suggesting higher cardiovascular exercise intensity.      Based on our conversation about fitness and menopause, I briefly scanned the scientific literature on changes to cardiovascular fitness after menopause. It turns out that menopause appears to lower VO2max in sedentary women, but not for masters endurance athletes. You clearly fall somewhere between those two extremes, so it's hard to predict how this may affect you. One takeaway is that fitness declines from menopause are not necessary inevitable, but that it may take consistent effort to prevent them. If you're curious, search online for Comparison of VO2max and disease risk factors between perimenopausal and postmenopausal women and Effect of age and menopausal status on cardiorespiratory fitness in masters women runners.    If you have questions, please reach out.    Andrea Sweeney  PhD, Exercise Physiology    " I have reviewed and confirmed nurses' notes...

## 2023-12-18 NOTE — PROGRESS NOTES
Palmira Caraballo comes into clinic today at the request of Dr. ELENA Car Ordering Provider for EKG.    This service provided today was under the supervising provider of the day Dr. ELENA Car, who was available if needed.    Laura Jefferson, [facility]

## 2023-12-18 NOTE — NURSING NOTE
Chief Complaint   Patient presents with    Physical     Patient is here for annual physical     Laura Ann CMA 7:07 AM on 12/18/2023

## 2023-12-18 NOTE — PROGRESS NOTES
History:  Palmira Caraballo is 53 year old female here for comprehensive eye exam.  Problems with her monovision contact lenses when on the computer.  HPI       Annual Eye Exam    In both eyes.  Associated symptoms include Negative for redness, jaw claudication, photophobia and foreign body sensation.  Treatments tried include no treatments.  Pain was noted as 0/10.             Comments    Patient present for routine eye exam. Patient just got glasses one month ago. Patient has no complaints at this time.  ROSALIA Kelly December 18, 2023 8:08 AM           Last edited by Myranda Bui on 12/18/2023  8:08 AM.        Past ocular history:  myopia- glasses   Soft contact lens wear part time  Allergic conjunctivitis    FH: no glaucoma /age related macular degeneration/ sister just had a torn retina      Assessment/Plan:    1. Myopia, bilateral - Both Eyes    2. Presbyopia of both eyes - Both Eyes    3. Dry eyes, bilateral - Both Eyes    4. Family history of retinal detachment        (H52.13) Myopia of both eyes/presbyopia  Comment: refraction not done at this visit, happy with new glasses, likes monovision contact lenses (gets outside)  Discussed with patient could try over the counter readers with the left lens popped out with her monovision contact lenses  Could also try multifocal contact lenses for work  Plan:  Contact lens fitting/evaluation here with optometry if she'd like  Or can return to me with contact lenses in for glasses refraction    (H04.123) Dry eyes, bilateral - Both Eyes  Comment: mild, trace cornea signs  Plan: trial of artificial tear drops four times a day as needed and preservative-free artificial tears if more than 6 x per day  Computer breaks 20seconds every 20 minutes looking 20 feet away    Reviewed signs and symptoms of flashes/floaters and to call if this occurs, healthy retinal exam today but myopic with family history of retinal tear.       Return in about 1 year (around 12/18/2024) for  Comprehensive Exam.     Complete documentation of historical and exam elements from today's encounter can be found in the full encounter summary report (not reduplicated in this progress note). I personally obtained the chief complaint(s) and history of present illness.  I have confirmed and edited as necessary the CC, HPI, PMH/PSH, social history, FMH, ROS, and exam/neuro findings as obtained by the technician or others. I have examined this patient myself and I personally viewed the image(s) and studies listed above and the documentation reflects my findings and interpretation.  I formulated and edited as necessary the assessment and plan and discussed the findings and management plan with the patient and family.     Arabella Mir MD

## 2023-12-18 NOTE — PROGRESS NOTES
AUDIOLOGY REPORT    SUMMARY: Audiology visit completed. See audiogram for results.      RECOMMENDATIONS: Follow-up with referring provider. Repeat hearing evaluation as medically indicated, sooner if concerns.      Sadie Turcios  Audiologist  [place] License  #[id number]

## 2023-12-18 NOTE — NURSING NOTE
Dermatology Rooming Note    Palmira Caraballo's goals for this visit include:   Chief Complaint   Patient presents with    Derm Problem     Cryotherapy     Karlee Farr CMA

## 2023-12-18 NOTE — LETTER
12/18/2023         RE: Palmira Caraballo  [address]  [place] [address]        Dear Colleague,    Thank you for referring your patient, Palmira Caraballo, to the [facility] [place]. Please see a copy of my visit note below.    Formerly [facility], later [facility] Outpatient Medical Nutrition Therapy      Time Spent: 40 minutes  Session Type:  follow up  Referral Source: [employer]/Dr. Ben Car  Reason for RD Visit:   Nutritional counseling     Nutrition Assessment:    Patient is a 53 y.o. female who is here for follow up annual visit with Registered Dietitian (RD).     At initial visit in 7/2022, she stated that she eats 2 meals per day and has 2 cups decaf coffee with 1% milk in the morning and skips breakfast. She initially started this when she was doing intermittent fasting, but she is specifically following this plan any longer. At that time she would eat meals in an 8-hour window during the day and fast/not eat for 16 hours overnight. On weekends she may eat breakfast though. She doesn't eat a lot of vegetables or fruit. She will have some berries at lunch and inconsistently has some vegetables with dinner but not necessarily every night. She does some cooking but reported not a big cook and does not necessarily make full balanced meals. Her kids are home from college so will make some meal for the family. In the summer months, she is better at eating more vegetables as well since her family likes fresh vegetables and do not like frozen. She typically doesn't snack unless very hungry, then has a cheese stick or granola bar. She drinks 2 c decaf coffee with 1% milk, estimated about 48-60 oz water per day. She adds a powdered fruit, veg supplement into one glass water in the morning. She discontinued this for awhile to see if it was really doing anything for her. She did feel more sluggish when she stopped taking it so restarted. She avoids caffeine because she  has trouble sleeping through the night and will notice this more with drinking caffeinated beverages so avoids them now. Reviewed her veg-fruit supplement label today which does have some green tea powder. She takes it in the morning and reported in general tea is better tolerated and doesn't cause her to be more wakeful like coffee or caffeinated diet soda does. She will double check the caffeine content online later as it was not reported on the box. She drinks 1 caffeine free diet coke per week. She will only have 1 glass beer or wine about 1-2 times per week. Overall she doesn't have any specific nutrition goals at this time but interested in general healthful diet education.    At follow up visit today on 12/18/23, she stated that she has recently noticed more GI issues/stomach upset which she believes may be triggered by dairy but uncertain. Just in the last week, she has decided to cut it out. She tried lactaid milk on Saturday but then also went out to eat and has a pasta dish with a cream sauce. While she was eating the dish, she had stomach upset. She doesn't drink a lot of alcohol but also recently noticed with having a glass of wine will get a headache right away. Otherwise she hasn't noticed what foods may be causing issues. She was wondering if menopause could play a role with causing some food intolerances. Her whole life she consumed a lot of dairy. When her daughters are home, they like to have hot chocolate at night. She hasn't felt well recently with drinking hot chocolate and believes the milk is the issues. Answered her questions today and discussed low FODMAP diet. She would like to try this diet. Otherwise now that they are empty nesters, she and her  have been eating better and cooking more balanced meals at home. Her dtrs all have different likes and tastes so cooking was more difficult before and now she and her  can make healthier meals for just the two of them.    Patient  "Active Problem List   Diagnosis    NO ACTIVE PROBLEMS    CARDIOVASCULAR SCREENING; LDL GOAL LESS THAN 160    Encounter for IUD insertion    Lichen sclerosus et atrophicus     Estimated body mass index is 25.25 kg/m  as calculated from the following:    Height as of an earlier encounter on 12/18/23: 1.695 m (5' 6.75\").    Weight as of an earlier encounter on 12/18/23: 72.6 kg (160 lb).    Diet Recall:  (some usual meals, snacks and beverages):  Meal Food    Breakfast Skips and just has 2 c decaf coffee black   Lunch Salad or sandwich with fruit or leftovers   Dinner Protein, starch, veg (chicken/turkey/pork/beef/fish, veggies, starch   Snacks Not usually.    Beverages 2 c decaf coffee black, ~64 oz water (24 oz at work and 2 large ~16 oz each glasses at dinner. Prev when kids home, has a cup hot rich at night but discontinued last week   Alcohol Intake Since Oct has only had 1 drink of wine b/c not tolerating it, causes headaches now.      Labs:    Last Comprehensive Metabolic Panel:  Sodium   Date Value Ref Range Status   07/28/2017 138 133 - 144 mmol/L Final     Potassium   Date Value Ref Range Status   07/28/2017 3.9 3.4 - 5.3 mmol/L Final     Chloride   Date Value Ref Range Status   07/28/2017 105 94 - 109 mmol/L Final     Carbon Dioxide   Date Value Ref Range Status   07/28/2017 26 20 - 32 mmol/L Final     Anion Gap   Date Value Ref Range Status   07/28/2017 7 3 - 14 mmol/L Final     Glucose   Date Value Ref Range Status   12/18/2023 102 (H) 70 - 99 mg/dL Final   07/13/2022 84 70 - 99 mg/dL Final   07/28/2017 85 70 - 99 mg/dL Final     Urea Nitrogen   Date Value Ref Range Status   07/28/2017 11 7 - 30 mg/dL Final     Creatinine   Date Value Ref Range Status   12/18/2023 0.75 0.51 - 0.95 mg/dL Final   07/28/2017 0.70 0.52 - 1.04 mg/dL Final     GFR Estimate   Date Value Ref Range Status   12/18/2023 >90 >60 mL/min/1.73m2 Final   07/28/2017 >90  Non  GFR Calc   >60 mL/min/1.7m2 Final     Calcium "   Date Value Ref Range Status   07/28/2017 9.0 8.5 - 10.1 mg/dL Final       CBC RESULTS:   Recent Labs   Lab Test 07/13/22  0718   WBC 4.3   RBC 4.78   HGB 13.5   HCT 41.5   MCV 87   MCH 28.2   MCHC 32.5   RDW 12.6        Pertinent Medications/vitamin and mineral supplements:     Current Outpatient Medications   Medication    clobetasol (TEMOVATE) 0.05 % external ointment    levonorgestrel (MIRENA) 20 MCG/24HR IUD    multivitamin w/minerals (MULTI-VITAMIN) tablet     No current facility-administered medications for this visit.     Food Allergies:  NKFA    Physical Activity: did not discuss at visit today. At last year's visit reported: 6 days per week does about 40 minutes of exercises (usually cardio and weights) using an radha with exercise.    Nutrition Prescription: nutrition education    Nutrition Intervention:    Nutrition Education/Counseling:  Reviewed low fodmap diet and other tips and answered her questions r/t possible food intolerances. Based on discussion today she would like to try a low fodmap diet.    Educational Materials Provided:  Nutrition Care Manual: Low FODMAP nutrition therapy and FODMAP food chart and [facility] Daily Nutrition Plate method handout     Goals:    Follow a low FODMAP diet for 3-4 weeks.  - Start with eliminating higher FODMAP foods for 3-4 weeks.   -Then after 3-4 weeks start adding those higher FODMAP foods back into diet. (see process for reintroducing below).    OR   If you prefer to do a partial lower FODMAP diet, then    -For Modified/partial lower FODMAP diet, review the list of high FODMAP foods and then eliminate several of those higher FODMAP foods that you eat most days of the week for 3-4 weeks.    After 3-4 weeks, to add higher FODMAP foods back into diet:    -Start by adding back in 1 higher fodmap food at a time by eating a small serving of that food each day for 3 days in a row OR you can gradually increase the portion size over the course of the 3 days. For  example on day 1, can have 1/4 cup serving, then on day 2, can have 1/3 c and then on day 3, can have a 1/2 cup serving.    -If tolerated, then wait at least one day, and then add another higher FODMAP food back into diet for 3 days in a row and onward.     -If you prefer to reintroduce foods slowly, then you could add one food each week (depending on how quickly you'd like to re-introduce and also depending on if you have a return of symptoms that may linger for a day or so).    -If you notice any symptoms after adding back in a higher FODMAP food than can wait until symptoms improve before trying the next higher FODMAP food.     -Also if you notice a significant increase in symptoms after retrying the first day, you do not need to eat on additional days.    --Keep daily food and beverage journals and track all symptoms.    --Plan menus and meals ahead of time to help you stick to the elimination diet.    Here are some websites with low FODMAP recipes:    Www.StupilapeverMyxer.Utopia  Www.Doculogy.[place]top[place]  IBSfree.net    [employer] at www.[employer] sells ready made low FODMAP meals. Here is a coupon code for 60% off some meals. Code = 60off    [facility] is the University that created and did the initial research for the low FODMAP diet. They have more information, an radha and food lists as well.     Nutrition Monitoring and Evaluation: Will monitor adherence to nutrition recommendations at future RD visits.     Further Medical Nutrition Therapy:  recommended f/up after following low fodmap for 3-4 weeks, when ready to reintro or if comfortable with reintroducing based on instructions provided, the f/up Annual visits    Patient was encouraged to call/contact RD with any further questions.            Again, thank you for allowing me to participate in the care of your patient.      Sincerely,    Isidra Bhatti RD

## 2023-12-18 NOTE — PATIENT INSTRUCTIONS
Cryotherapy    What is it?  Use of a very cold liquid, such as liquid nitrogen, to freeze and destroy abnormal skin cells that need to be removed    What should I expect?  Tenderness and redness  A small blister that might grow and fill with dark purple blood. There may be crusting.  More than one treatment may be needed if the lesions do not go away.    How do I care for the treated area?  Gently wash the area with your hands when bathing.  Use a thin layer of Vaseline to help with healing. You may use a Band-Aid.   The area should heal within 7-10 days and may leave behind a pink or lighter color.   Do not use an antibiotic or Neosporin ointment.   You may take acetaminophen (Tylenol) for pain.     Call your doctor if you have:  Severe pain  Signs of infection (warmth, redness, cloudy yellow drainage, and or a bad smell)  Questions or concerns    Who should I call with questions?      [facility]: [phone]      [facility]: [phone]      For urgent needs outside of business hours call the [facility] at [phone] and ask for the dermatology resident on call

## 2023-12-18 NOTE — LETTER
12/18/2023     RE: Palmira Caraballo  [address] [place] [address]     Dear Colleague,    Thank you for referring your patient, Palmira Caraballo, to the [facility] DERMATOLOGY [facility]IN[facility] [place] [facility]. Please see a copy of my visit note below.    CC: Patient is here today for cryotherapy of a verrucous papule on her left palm as [facility]     Subjective and Objective: See separate visit note.    Procedure Note:  Cryotherapy procedure note, location(s): left palm. After verbal consent and discussion of risks and benefits including, but not limited to, dyspigmentation/scar, blister, and pain, 1 lesion(s) was(were) treated with 1-2 mm freeze border for 1-2 cycles with liquid nitrogen. Post cryotherapy instructions were provided.    Follow-up PRN, pending new or changing lesions.    Scribe Disclosure:   I, Annita Mena, am serving as a scribe to document services personally performed by Michaela Ramey PA-C based on data collection and the provider's statements to me.     Provider Disclosure:   The documentation recorded by the scribe accurately reflects the services I personally performed and the decisions made by me.    All risks, benefits and alternatives were discussed with patient.  Patient is in agreement and understands the assessment and plan.  All questions were answered.  Sun Screen Education was given.   Return to Clinic as needed   Michaela Ramey PA-C   [facility]

## 2023-12-18 NOTE — PROGRESS NOTES
[facility] Outpatient Medical Nutrition Therapy      Time Spent: 40 minutes  Session Type:  follow up  Referral Source: [employer]/Dr. Ben Car  Reason for RD Visit:   Nutritional counseling     Nutrition Assessment:    Patient is a 53 y.o. female who is here for follow up annual visit with Registered Dietitian (DAMIÁN).     At initial visit in 7/2022, she stated that she eats 2 meals per day and has 2 cups decaf coffee with 1% milk in the morning and skips breakfast. She initially started this when she was doing intermittent fasting, but she is specifically following this plan any longer. At that time she would eat meals in an 8-hour window during the day and fast/not eat for 16 hours overnight. On weekends she may eat breakfast though. She doesn't eat a lot of vegetables or fruit. She will have some berries at lunch and inconsistently has some vegetables with dinner but not necessarily every night. She does some cooking but reported not a big cook and does not necessarily make full balanced meals. Her kids are home from college so will make some meal for the family. In the summer months, she is better at eating more vegetables as well since her family likes fresh vegetables and do not like frozen. She typically doesn't snack unless very hungry, then has a cheese stick or granola bar. She drinks 2 c decaf coffee with 1% milk, estimated about 48-60 oz water per day. She adds a powdered fruit, veg supplement into one glass water in the morning. She discontinued this for awhile to see if it was really doing anything for her. She did feel more sluggish when she stopped taking it so restarted. She avoids caffeine because she has trouble sleeping through the night and will notice this more with drinking caffeinated beverages so avoids them now. Reviewed her veg-fruit supplement label today which does have some green tea powder. She takes it in the morning and reported in general tea is better  tolerated and doesn't cause her to be more wakeful like coffee or caffeinated diet soda does. She will double check the caffeine content online later as it was not reported on the box. She drinks 1 caffeine free diet coke per week. She will only have 1 glass beer or wine about 1-2 times per week. Overall she doesn't have any specific nutrition goals at this time but interested in general healthful diet education.    At follow up visit today on 12/18/23, she stated that she has recently noticed more GI issues/stomach upset which she believes may be triggered by dairy but uncertain. Just in the last week, she has decided to cut it out. She tried lactaid milk on Saturday but then also went out to eat and has a pasta dish with a cream sauce. While she was eating the dish, she had stomach upset. She doesn't drink a lot of alcohol but also recently noticed with having a glass of wine will get a headache right away. Otherwise she hasn't noticed what foods may be causing issues. She was wondering if menopause could play a role with causing some food intolerances. Her whole life she consumed a lot of dairy. When her daughters are home, they like to have hot chocolate at night. She hasn't felt well recently with drinking hot chocolate and believes the milk is the issues. Answered her questions today and discussed low FODMAP diet. She would like to try this diet. Otherwise now that they are empty nesters, she and her  have been eating better and cooking more balanced meals at home. Her dtrs all have different likes and tastes so cooking was more difficult before and now she and her  can make healthier meals for just the two of them.    Patient Active Problem List   Diagnosis    NO ACTIVE PROBLEMS    CARDIOVASCULAR SCREENING; LDL GOAL LESS THAN 160    Encounter for IUD insertion    Lichen sclerosus et atrophicus     Estimated body mass index is 25.25 kg/m  as calculated from the following:    Height as of an  "earlier encounter on 12/18/23: 1.695 m (5' 6.75\").    Weight as of an earlier encounter on 12/18/23: 72.6 kg (160 lb).    Diet Recall:  (some usual meals, snacks and beverages):  Meal Food    Breakfast Skips and just has 2 c decaf coffee black   Lunch Salad or sandwich with fruit or leftovers   Dinner Protein, starch, veg (chicken/turkey/pork/beef/fish, veggies, starch   Snacks Not usually.    Beverages 2 c decaf coffee black, ~64 oz water (24 oz at work and 2 large ~16 oz each glasses at dinner. Prev when kids home, has a cup hot rich at night but discontinued last week   Alcohol Intake Since Oct has only had 1 drink of wine b/c not tolerating it, causes headaches now.      Labs:    Last Comprehensive Metabolic Panel:  Sodium   Date Value Ref Range Status   07/28/2017 138 133 - 144 mmol/L Final     Potassium   Date Value Ref Range Status   07/28/2017 3.9 3.4 - 5.3 mmol/L Final     Chloride   Date Value Ref Range Status   07/28/2017 105 94 - 109 mmol/L Final     Carbon Dioxide   Date Value Ref Range Status   07/28/2017 26 20 - 32 mmol/L Final     Anion Gap   Date Value Ref Range Status   07/28/2017 7 3 - 14 mmol/L Final     Glucose   Date Value Ref Range Status   12/18/2023 102 (H) 70 - 99 mg/dL Final   07/13/2022 84 70 - 99 mg/dL Final   07/28/2017 85 70 - 99 mg/dL Final     Urea Nitrogen   Date Value Ref Range Status   07/28/2017 11 7 - 30 mg/dL Final     Creatinine   Date Value Ref Range Status   12/18/2023 0.75 0.51 - 0.95 mg/dL Final   07/28/2017 0.70 0.52 - 1.04 mg/dL Final     GFR Estimate   Date Value Ref Range Status   12/18/2023 >90 >60 mL/min/1.73m2 Final   07/28/2017 >90  Non  GFR Calc   >60 mL/min/1.7m2 Final     Calcium   Date Value Ref Range Status   07/28/2017 9.0 8.5 - 10.1 mg/dL Final       CBC RESULTS:   Recent Labs   Lab Test 07/13/22  0718   WBC 4.3   RBC 4.78   HGB 13.5   HCT 41.5   MCV 87   MCH 28.2   MCHC 32.5   RDW 12.6        Pertinent Medications/vitamin and mineral " supplements:     Current Outpatient Medications   Medication    clobetasol (TEMOVATE) 0.05 % external ointment    levonorgestrel (MIRENA) 20 MCG/24HR IUD    multivitamin w/minerals (MULTI-VITAMIN) tablet     No current facility-administered medications for this visit.     Food Allergies:  NKFA    Physical Activity: did not discuss at visit today. At last year's visit reported: 6 days per week does about 40 minutes of exercises (usually cardio and weights) using an radha with exercise.    Nutrition Prescription: nutrition education    Nutrition Intervention:    Nutrition Education/Counseling:  Reviewed low fodmap diet and other tips and answered her questions r/t possible food intolerances. Based on discussion today she would like to try a low fodmap diet.    Educational Materials Provided:  Nutrition Care Manual: Low FODMAP nutrition therapy and FODMAP food chart and [facility] Daily Nutrition Plate method handout     Goals:    Follow a low FODMAP diet for 3-4 weeks.  - Start with eliminating higher FODMAP foods for 3-4 weeks.   -Then after 3-4 weeks start adding those higher FODMAP foods back into diet. (see process for reintroducing below).    OR   If you prefer to do a partial lower FODMAP diet, then    -For Modified/partial lower FODMAP diet, review the list of high FODMAP foods and then eliminate several of those higher FODMAP foods that you eat most days of the week for 3-4 weeks.    After 3-4 weeks, to add higher FODMAP foods back into diet:    -Start by adding back in 1 higher fodmap food at a time by eating a small serving of that food each day for 3 days in a row OR you can gradually increase the portion size over the course of the 3 days. For example on day 1, can have 1/4 cup serving, then on day 2, can have 1/3 c and then on day 3, can have a 1/2 cup serving.    -If tolerated, then wait at least one day, and then add another higher FODMAP food back into diet for 3 days in a row and onward.     -If you  prefer to reintroduce foods slowly, then you could add one food each week (depending on how quickly you'd like to re-introduce and also depending on if you have a return of symptoms that may linger for a day or so).    -If you notice any symptoms after adding back in a higher FODMAP food than can wait until symptoms improve before trying the next higher FODMAP food.     -Also if you notice a significant increase in symptoms after retrying the first day, you do not need to eat on additional days.    --Keep daily food and beverage journals and track all symptoms.    --Plan menus and meals ahead of time to help you stick to the elimination diet.    Here are some websites with low FODMAP recipes:    Www.fodmapeveryday.[employer]  Www.[employer]  IBSfree.net    [employer] at www.BragThis.com sells ready made low FODMAP meals. Here is a coupon code for 60% off some meals. Code = 60off    [employer] is the University that created and did the initial research for the low FODMAP diet. They have more information, an radha and food lists as well.     Nutrition Monitoring and Evaluation: Will monitor adherence to nutrition recommendations at future RD visits.     Further Medical Nutrition Therapy:  recommended f/up after following low fodmap for 3-4 weeks, when ready to reintro or if comfortable with reintroducing based on instructions provided, the f/up Annual visits    Patient was encouraged to call/contact RD with any further questions.    Isidra Bhatti, MS, RD, LD

## 2023-12-18 NOTE — NURSING NOTE
AHA BP    1st    111/75  2nd  110/75  3rd   109/71    Average  110/74  Laura Ann CMA 11:10 AM on 12/18/2023

## 2023-12-18 NOTE — PROCEDURES
CC: Patient is here today for cryotherapy of a verrucous papule on her left palm.    Subjective and Objective: See separate visit note.    Procedure Note:  Cryotherapy procedure note, location(s): left palm. After verbal consent and discussion of risks and benefits including, but not limited to, dyspigmentation/scar, blister, and pain, 1 lesion(s) was(were) treated with 1-2 mm freeze border for 1-2 cycles with liquid nitrogen. Post cryotherapy instructions were provided.    Follow-up PRN, pending new or changing lesions.    Scribe Disclosure:   I, Annita Mena, am serving as a scribe to document services personally performed by Michaela Ramey PA-C based on data collection and the provider's statements to me.     ***

## 2023-12-18 NOTE — LETTER
"January 2, 2024      Palmira Caraballo  [address]  [place] [address]              Dear Palmira,     It was a pleasure to see you again at your recent visit to the [facility] clinic.  I am writing to report the results of your tests, and to review several recommendations.     A complete blood count was normal, including a white blood cell count of 4100, a hemoglobin level of 13.3, and a platelet count of 192,000.  A creatinine level, which measures kidney function, was normal at 0.75 milligrams/deciliter.  Alkaline phosphatase and alanine aminotransferase (ALT), enzymes that measure liver activity, were normal at 51 and 23 units/liter, respectively.     Your total cholesterol level was 183, with a triglyceride level of 41, an HDL or \"good\" cholesterol level of 78, an LDL or \"bad\" cholesterol level of 97, and a cholesterol/HDL ratio of 2.3.  As we discussed at the time of your appointment, guidelines from the American Heart Association and American College of Cardiology estimate your 10-year risk of a vascular event at 0.6%, a reading lower than the 0.9% \"optimal\" risk for your age/gender cohort.  I encourage you to continue your regimen of regular exercise, while maintaining ideal weight and following a modified diet.  Specifically, I encourage you to limit calorie intake, especially refined sugars and starches, saturated and \"trans-\" fats, while including monounsaturated and omega-3 fats in your diet.  Sources of monounsaturated fat include olive oil, almonds, and avocados; sources of omega-3 fat include oily fish, such as salmon, sardines, light tuna, and trout; other sources include walnuts and flaxseed oil.    A thyroid stimulating hormone (TSH) level, which measures thyroid function, was normal at 3.78.  A screen for HIV was nonreactive, or negative/normal.  Your 25-hydroxy vitamin D level was 36 (our normal range is 20-50).    Your glucose level was mildly elevated at 102.  Blood " "glucose readings in the range of 100-125 are considered evidence of impaired fasting glucose, or \"pre-diabetes\".  Maintenance of ideal weight, regular exercise, and a reduced-calorie diet, which specifically avoids sugars and starches, all can help postpone or prevent the development of diabetes.  In this setting, some diabetes experts suggest semiannual measurement of fasting glucose and glycosylated hemoglobin levels and annual measurement of urine albumin/creatinine.     An EKG showed sinus bradycardia, a slow pulse that reflects your excellent physical fitness.  A spirometry test, which measures lung function, showed an FEV1 of 3.37, with an FVC of 4.02; these readings were 126% and 121% of predicted values, respectively.    An audiology evaluation showed normal hearing in both ears.     Dual energy x-ray absorptiometry (DEXA) showed normal bone density, with most negative and valid T-score of 0.7 at the level of the left total hip and right femoral neck.    Body composition analysis showed 41.8% fat (80th percentile).  Your body mass index was 25.4.  A reading from July, 2022 showed 35.9% fat (55th percentile).  In addition to regular exercise and reduced calorie intake, strategies to facilitate reduction of body fat and weight include copious water intake; use of small plates; healthful snacking, such as small quantities of almonds or walnuts consumed mid-morning and mid-afternoon; avoidance of sugars and starches, which can trigger increased insulin release and stimulate appetite; slow, \"mindful\" eating, with pleasing food presentation and focus on non-caloric aspects of the dining experience, including social interactions and appreciation of the aromas, appearance, and flavors of the meal.  Preliminary evidence suggests possible weight and glucose control benefits from intermittent fasting, such as limiting food consumption to an 8-hour window each day, while fasting for the remaining 16 hours of each " day.    You described perimenopausal symptoms that we agreed you would manage with non-pharmacologic methods, including reduced consumption of caffeine and alcohol, avoidance of spicy/hot foods, maintenance of cool ambient temperatures with use of fans or air conditioning is needed, and stress management.  In the event that your symptoms progress or persist despite these measures, I encourage you to pursue other treatment options.    You described abdominal symptoms suggestive of lactose intolerance which had improved with a modest reduction of lactose intake.  You are advised to pursue a trial of scrupulous avoidance of all lactose-containing foods with close monitoring of symptoms.  If your symptoms have not fully subsided over a period of 2 weeks, you were advised to monitor your symptoms with serial elimination of FODMAPS foods.  In the event that you are unable to identify a dietary trigger, or if your symptoms persist for any reason, I encourage you to pursue further evaluation including consideration of colonoscopy with screening for microscopic colitis.    Based on the finding of a narrow airway on examination, coupled with your history of snoring and daytime drowsiness, we agreed that you would ask your  to monitor your breathing during sleep.  Further evaluation through a sleep clinic should be pursued if he observes loud snoring, gasping, or pauses in your breathing during sleep.    With regard to preventive care, a colonoscopy was completed in December, 2022, at which time 10-year follow-up was recommended.  To bring your immunization status up-to-date, I recommend a hepatitis B vaccination series and a COVID vaccination booster.  You were advised to use a vitamin D3 supplement, 50 mcg daily, while maintaining daily calcium intake of 1200 mg, preferably from dietary sources.  We reviewed the elements of a healthful diet and the roles of various types of exercise, including the fat-burning effects  of strength training and subthreshold exercise and the time economy associated with high-intensity interval training.     Palmira, I enjoyed visiting with you and hearing updates on your accomplished daughters and your evolving interests outside of work.  Thank you for choosing the [facility] program and [facility] for your medical needs.  Best wishes for a productive and healthy year.  Please do not hesitate to contact me with any questions regarding these results or recommendations.           Sincerely,           Ben Car M.D.  [facility]  Division of General Internal Medicine  Department of Medicine

## 2023-12-19 LAB
EXPTIME-PRE: 6.35 SEC
FEF2575-%PRED-PRE: 151 %
FEF2575-PRE: 3.94 L/SEC
FEF2575-PRED: 2.6 L/SEC
FEFMAX-%PRED-PRE: 117 %
FEFMAX-PRE: 8.09 L/SEC
FEFMAX-PRED: 6.91 L/SEC
FEV1-%PRED-PRE: 126 %
FEV1-PRE: 3.37 L
FEV1FEV6-PRE: 84 %
FEV1FEV6-PRED: 82 %
FEV1FVC-PRE: 84 %
FEV1FVC-PRED: 81 %
FIFMAX-PRE: 3.79 L/SEC
FVC-%PRED-PRE: 121 %
FVC-PRE: 4.02 L
FVC-PRED: 3.31 L

## 2023-12-19 NOTE — PROGRESS NOTES
History and Physical Examination     SUBJECTIVE: Chief concern: preventive health review.     Past Medical History:  1.   3 para 3, with normal spontaneous vaginal deliveries in 2000, 10/2002, and 2005.  2.  History of minimal subclinical hypothyroidism  3.  History of microscopic hematuria with negative evaluation at [facility], 10/2019  4.  History of narrow airway with snoring  5.  Raynaud's phenomenon  6.  Lichen sclerosis et atrophicus     Adverse Drug Reactions: None.     Current Medications:     Levonorgestrol, 20 mcg/24 hour IUD.  Clobetasol 0.05%, applied twice daily as needed for itching.  Daily multivitamin supplement      Habits:  Tobacco: Never  Alcohol: None since ; history of 1-2 servings per week  Caffeine: None.  Cannabis/Street drugs: Rare use of oral CBD for insomnia     Social History:  to Fredrick, whom she met in high school, and mother of 3 daughters: Prudence, age 22, who moved to [place] following her graduation from the [employer] and is currently job searching; Mindy, age 20, a selam in the [employer] at the [employer]; and Josefa, age 18, who attends the [employer].   Palmira was born in [place] and lived in [place] and [place] before moving to [place] at age 12, when her father was transferred to the headquarters of the [employer].  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  Away from work, she enjoys family activities, hosting dinner parties, attending theater, reading, and exercise.  She exercises for 40 minutes daily, following the [employer] program that includes aerobic and strength training.     Family History: Mother  in  at age 75 from complications of cortical  "basal degeneration and apraxia, with history of hypertension and TIA at age 65. Father has a history of colonic polyps at age 79.  A sister 2 years her senior has a history of colonic polyps.  A brother 4 years her selam is in good health.  Maternal grandmother with diagnosed with breast cancer in her 50s and  at age 90.  Maternal grandfather  from an unspecified aneurysm in his 50s.  Paternal grandmother  in her late 80s to early 90s.  Paternal grandfather  in his late 80s to 90s, with possible history of colon cancer.  Daughters are in good health.     Review of Systems: Over the past year, Palmira has developed symptoms she suspects represent perimenopause, including weight gain and vasomotor symptoms with heat intolerance and diaphoresis, especially at night; she sleeps for approximately 4 hours after going to bed, then awakens every hour with diaphoresis; she describes unchanged intermittent snoring, with occasional daytime somnolence.  She has not noted lymphadenopathy, unexplained weight loss, fever, or symptoms of infection.  She notes intolerance to use of alcohol, noting more pronounced flushing and mild dyspepsia; symptoms were not noted until the past year.  She notes intermittent abdominal bloating with nausea and gas which she suspects may be related in part to consumption of dairy products; symptoms have improved with elimination of milk but she notes that she continues to consume butter and cheese.  Fingers and toes are \"always cold\", without progression of symptoms previously attributed to Raynaud's phenomenon.  Palmira is followed regularly by an outside gynecologist; she indicates that cervical cancer screening was negative in 2023 at which time she underwent bilateral breast examination.  Colonoscopy was completed in 2022, with findings of a hyperplastic polyp versus lymphoid aggregate; 10-year follow-up was recommended at that time.  COVID (Pfizer) vaccinations administered " "on 3/13 and 4/3/2021; Covid (Moderna) boosters were administered on 10/29/2021 and 7/16/2022.  Tetanus (Td) was administered in 10/2019; Tdap was administered in 2009.  Recombinant zoster vaccination series was completed in 9/2022.  Influenza vaccination was administered earlier this season.  She believes that she has never completed a hepatitis B vaccination series.  Remainder of complete review of systems was negative.       OBJECTIVE:     Vital signs: Height 66.75 inches.  Weight 160 pounds.  Blood pressure 110/74 on average of 3 automated readings.  Heart rate 63.  Respiratory rate 16.  Temperature 98.3 degrees.  O2 saturation 97% on room air.    General: Alert, neatly dressed and groomed, in no acute distress.  HEENT: Atraumatic and normocephalic. Eyelids, pupils, and conjunctivae appeared normal. Lips, teeth and gums appear normal.  Oropharynx showed moist mucous membranes, without exudate or erythema. Somewhat \"crowded\" soft palate with relatively narrow airway.    Neck: Supple, without thyromegaly, mass, or bruit. No cervical or supraclavicular lymphadenopathy.  Back: No spinal or costovertebral angle tenderness.  Chest: Clear to auscultation and percussion. Normal respiratory effort.  Cardiovascular: No jugular venous distention. Regular rate and rhythm, normal S1, S2 without murmur.  Abdomen: Bowel sounds positive; soft, nontender, without rebound, guarding, hepatosplenomegaly or mass.  Extremities: No cyanosis or edema. Cool fingers, without pallor or cyanosis.  Normal pulses throughout.    Breasts: Examination was deferred; examination was completed by her gynecologist in the recent past.  Skin: Examination was deferred; full evaluation was completed earlier in day through dermatology clinic.  Neurologic: Cranial nerves II-XII were grossly intact. Sensory and motor examinations were normal. Normal gait.  Mini-cog score was 5/5.  Psychiatric: Alert and oriented ×3. Normal affect. Judgment and insight " "intact.  PHQ-2 score was 0.  ARLET-7 score 0.     Creatinine 0.75, alkaline phosphatase 51, ALT 23, cholesterol 183, HDL 78, LDL 97, triglycerides 41, cholesterol/HDL 2.3, TSH 3.78, 25-hydroxy vitamin D 36, glucose 102, white blood cell count 4100, hemoglobin 13.3, platelets 192,000, HIV nonreactive.     EKG showed sinus bradycardia.  Spirometry showed an FEV1 of 3.37, with an FVC of 4.02; readings were 126% and 121% of predicted values, respectively.    An audiology evaluation showed normal hearing bilaterally.     Preiliminary DEXA results showed normal bone density.      Body composition analysis showed 41.8% fat (80th percentile); body mass index was 25.4.  Reading from 7/2022 showed 35.9% fat (55th percentile).        ASSESSMENT:     1.  Perimenopausal symptoms.  She describes vasomotor symptoms with night sweats, without other suggestion of chronic infection, malignancy, or other cause for her symptoms.  We discussed options for management and my suggestion that she first exhaust non-pharmacologic methods for management, including continued caffeine avoidance of an alcohol; avoidance of hot/\"spicy\" foods; maintenance of cool ambient temperatures with use of fans or air conditioning as needed; and stress management.  She was advised that additional therapy would be considered in the event of progressive or persistent symptoms.     2.  Abdominal symptoms.  Palmira describes symptoms suggestive of lactose intolerance.  She has noted improvement with a modest reduction of lactose intake.  I recommended a trial of scrupulous avoidance of all lactose-containing foods; symptoms do not fully subside, she will consider additional food elimination with attention to FODMAPS.  She was advised of the role of lactase and alpha galactosidase supplements depending on the pattern noted.  I emphasized the importance of pursuing further evaluation if she is unable to isolate a specific trigger, in which event further evaluation " "including colonoscopy to exclude microscopic colitis should be considered.     3.  Snoring.  Associated with a narrow airway, with mild daytime somnolence.  We agreed that the sleep disruption she has noted with perimenopausal symptoms may be explaining her daytime somnolence.  Palmira was advised to ask her  to monitor her breathing during sleep and to pursue further evaluation if he observes loud snoring, gasping, or pauses in breathing during sleep.     4.  History of subclinical hypothyroidism.  Current TSH level is normal.  She describes weight gain that she attributes to perimenopausal changes.    5.  Raynaud's phenomenon.  Mild symptoms, currently not bothersome.  We reviewed the options available for treatment, and the importance of pursuing further evaluation if pain, ulceration, or more pronounced symptoms arise.    6.  Increased body fat percentage.  We discussed the risks associated with higher levels of body fat and reviewed strategies for reducing body fat and weight through modification of diet and exercise.      7.  Impaired fasting glucose.  We discussed the implications of this diagnosis, along with the importance of maintaining ideal weight, while continuing a regimen of regular exercise and following a modified diet.  She will be advised of usual guidelines regarding monitoring of fasting glucose levels.    8.  Preventive care.  Using her cholesterol fractionation and blood pressure data, guidelines from the AHA/ACC estimate her 10-year risk of a vascular event at 0.6%, lower than the 0.9% \"optimal\" risk for her age/gender cohort.  Colonoscopy was completed in 12/2022, at which time 10-year follow-up was recommended.  To bring her immunization status up-to-date, I will recommend hepatitis B vaccination series and a COVID vaccination booster.  She was advised to use a vitamin D3 supplement, 50 mcg daily, while maintaining daily calcium intake of 1200 mg, preferably from dietary sources.  We " reviewed dietary strategies for reducing body fat weight as well as the roles of various types of exercise, including the time economy associated dull with high-intensity interval training in the fat-burning effects of strength training and subthreshold exercise.     PLAN: See above.     ~SRT

## 2023-12-20 LAB
ATRIAL RATE - MUSE: 57 BPM
DIASTOLIC BLOOD PRESSURE - MUSE: NORMAL MMHG
INTERPRETATION ECG - MUSE: NORMAL
P AXIS - MUSE: 57 DEGREES
PR INTERVAL - MUSE: 192 MS
QRS DURATION - MUSE: 90 MS
QT - MUSE: 400 MS
QTC - MUSE: 389 MS
R AXIS - MUSE: 53 DEGREES
SYSTOLIC BLOOD PRESSURE - MUSE: NORMAL MMHG
T AXIS - MUSE: 49 DEGREES
VENTRICULAR RATE- MUSE: 57 BPM

## 2025-01-21 ENCOUNTER — VIRTUAL VISIT (OUTPATIENT)
Dept: INTERNAL MEDICINE | Facility: CLINIC | Age: 55
End: 2025-01-21
Payer: COMMERCIAL

## 2025-01-21 DIAGNOSIS — Z00.00 VISIT FOR PREVENTIVE HEALTH EXAMINATION: ICD-10-CM

## 2025-01-21 DIAGNOSIS — Z00.00 ENCOUNTER FOR PREVENTIVE HEALTH EXAMINATION: Primary | ICD-10-CM

## 2025-01-21 PROCEDURE — 99207 PR NO CHARGE LOS: CPT | Mod: 93

## 2025-01-21 NOTE — PROGRESS NOTES
Health Maintenance:  Do you have a PCP? No  When was your last visit with your PCP?   When was your last eye exam? 1 year  Have you ever had a colonoscopy? Yes   If yes, when? 2022  Have you ever had any polyps removed?       If Female: (25 years old+) When was your last pap smear ? 2023  Have you ever had abnormal pap smear results? No    (40 years old+) When was your last mammogram? 2023   If last mammogram was more than 1 year ago and patient would like to get mammogram done as part of their  visit, ask the following questions:   1. Do you have any current or new breast problems? No   2. Where was your last mammogram done?  [facility]   3. Have your had any breast surgeries? No    As part of your visit we will set up a DEXA scan which will measure your body composition. We have a few questions that need to be answered before we can schedule this scan:   What is your approximate weight? 160   Have you ever had a DEXA scan within the past 2 years? Yes   Will you have any other imaging studies with contrast (x-ray, CT scan) within 7 days of this appointment? No   Have you had any spine or hip surgery? No   Do you take any vitamins that contain calcium or antacids with calcium? No    If yes, stop taking 24 hours prior to visit.     Goals for the Visit:  Thorough Comprehensive Preventive Exam    Pertinent past Medical/Family and Social HX:   Pertinent sx that desire are addressed with this visit:     Instructions prior to appointment:   1. Fast beginning at 10 pm for lab appointment  2. If your preventive care assessment package includes a Fitness Assessment, please bring athletic shoes. Complementary [facility] fitness attire is provided and yours to keep.  3. If eye exam, eyes may be dilated, it will last 4-6 hours, may want to bring sunglasses.   4. May bring laptop or other work materials for use during downtime.   5. You will receive an email about 3 days prior to your visit with a final itinerary,  menu selections for the complementary breakfast and lunch and instructions for the visit.     Complimentary  Parking provided. Drop off car in front of [facility], take the patient elevators to the [facility]. When you enter in the lobby, identify yourself as an [facility] [atient and you will be escorted up to the clinic.   If questions arise prior to your appointment please contact the clinic at [phone].

## 2025-01-22 ENCOUNTER — PATIENT OUTREACH (OUTPATIENT)
Dept: CARE COORDINATION | Facility: CLINIC | Age: 55
End: 2025-01-22
Payer: COMMERCIAL

## 2025-01-30 ENCOUNTER — OFFICE VISIT (OUTPATIENT)
Dept: GASTROENTEROLOGY | Facility: CLINIC | Age: 55
End: 2025-01-30

## 2025-01-30 ENCOUNTER — APPOINTMENT (OUTPATIENT)
Dept: INTERNAL MEDICINE | Facility: CLINIC | Age: 55
End: 2025-01-30

## 2025-01-30 ENCOUNTER — OFFICE VISIT (OUTPATIENT)
Dept: OPHTHALMOLOGY | Facility: CLINIC | Age: 55
End: 2025-01-30

## 2025-01-30 ENCOUNTER — ANCILLARY PROCEDURE (OUTPATIENT)
Dept: MAMMOGRAPHY | Facility: CLINIC | Age: 55
End: 2025-01-30

## 2025-01-30 ENCOUNTER — OFFICE VISIT (OUTPATIENT)
Dept: DERMATOLOGY | Facility: CLINIC | Age: 55
End: 2025-01-30

## 2025-01-30 ENCOUNTER — ALLIED HEALTH/NURSE VISIT (OUTPATIENT)
Dept: INTERNAL MEDICINE | Facility: CLINIC | Age: 55
End: 2025-01-30

## 2025-01-30 ENCOUNTER — ANCILLARY PROCEDURE (OUTPATIENT)
Dept: BONE DENSITY | Facility: CLINIC | Age: 55
End: 2025-01-30

## 2025-01-30 ENCOUNTER — OFFICE VISIT (OUTPATIENT)
Dept: INTERNAL MEDICINE | Facility: CLINIC | Age: 55
End: 2025-01-30

## 2025-01-30 ENCOUNTER — OFFICE VISIT (OUTPATIENT)
Dept: AUDIOLOGY | Facility: CLINIC | Age: 55
End: 2025-01-30

## 2025-01-30 ENCOUNTER — OFFICE VISIT (OUTPATIENT)
Dept: PULMONOLOGY | Facility: CLINIC | Age: 55
End: 2025-01-30

## 2025-01-30 VITALS
OXYGEN SATURATION: 98 % | DIASTOLIC BLOOD PRESSURE: 73 MMHG | HEIGHT: 67 IN | HEART RATE: 57 BPM | BODY MASS INDEX: 24.64 KG/M2 | TEMPERATURE: 97.8 F | RESPIRATION RATE: 16 BRPM | WEIGHT: 157 LBS | SYSTOLIC BLOOD PRESSURE: 110 MMHG

## 2025-01-30 DIAGNOSIS — Z00.00 ENCOUNTER FOR PREVENTIVE HEALTH EXAMINATION: ICD-10-CM

## 2025-01-30 DIAGNOSIS — D22.9 MULTIPLE BENIGN NEVI: ICD-10-CM

## 2025-01-30 DIAGNOSIS — Z00.00 ENCOUNTER FOR PREVENTIVE HEALTH EXAMINATION: Primary | ICD-10-CM

## 2025-01-30 DIAGNOSIS — Z01.10 EXAMINATION OF EARS AND HEARING: Primary | ICD-10-CM

## 2025-01-30 DIAGNOSIS — Z00.00 VISIT FOR PREVENTIVE HEALTH EXAMINATION: ICD-10-CM

## 2025-01-30 DIAGNOSIS — L90.0 LICHEN SCLEROSUS ET ATROPHICUS: ICD-10-CM

## 2025-01-30 DIAGNOSIS — L57.8 ACTINIC SKIN DAMAGE: Primary | ICD-10-CM

## 2025-01-30 DIAGNOSIS — H02.88A MEIBOMIAN GLAND DYSFUNCTION (MGD) OF UPPER AND LOWER LIDS OF BOTH EYES: Primary | ICD-10-CM

## 2025-01-30 DIAGNOSIS — Z71.82 EXERCISE COUNSELING: Primary | ICD-10-CM

## 2025-01-30 DIAGNOSIS — H02.88B MEIBOMIAN GLAND DYSFUNCTION (MGD) OF UPPER AND LOWER LIDS OF BOTH EYES: Primary | ICD-10-CM

## 2025-01-30 DIAGNOSIS — L81.4 LENTIGINES: ICD-10-CM

## 2025-01-30 DIAGNOSIS — H52.13 MYOPIA OF BOTH EYES: ICD-10-CM

## 2025-01-30 LAB
ALP SERPL-CCNC: 72 U/L (ref 40–150)
ALT SERPL W P-5'-P-CCNC: 12 U/L (ref 0–50)
ATRIAL RATE - MUSE: 68 BPM
BASOPHILS # BLD AUTO: 0 10E3/UL (ref 0–0.2)
BASOPHILS NFR BLD AUTO: 1 %
CHOLEST SERPL-MCNC: 172 MG/DL
CREAT SERPL-MCNC: 0.81 MG/DL (ref 0.51–0.95)
DIASTOLIC BLOOD PRESSURE - MUSE: NORMAL MMHG
EGFRCR SERPLBLD CKD-EPI 2021: 87 ML/MIN/1.73M2
EOSINOPHIL # BLD AUTO: 0.1 10E3/UL (ref 0–0.7)
EOSINOPHIL NFR BLD AUTO: 2 %
ERYTHROCYTE [DISTWIDTH] IN BLOOD BY AUTOMATED COUNT: 12.2 % (ref 10–15)
EXPTIME-PRE: 7.38 SEC
FASTING STATUS PATIENT QL REPORTED: NORMAL
FASTING STATUS PATIENT QL REPORTED: NORMAL
FEF2575-%PRED-PRE: 161 %
FEF2575-PRE: 4.21 L/SEC
FEF2575-PRED: 2.6 L/SEC
FEFMAX-%PRED-PRE: 120 %
FEFMAX-PRE: 8.5 L/SEC
FEFMAX-PRED: 7.02 L/SEC
FEV1-%PRED-PRE: 126 %
FEV1-PRE: 3.45 L
FEV1FEV6-PRE: 84 %
FEV1FEV6-PRED: 82 %
FEV1FVC-PRE: 84 %
FEV1FVC-PRED: 81 %
FIFMAX-PRE: 4.77 L/SEC
FVC-%PRED-PRE: 121 %
FVC-PRE: 4.1 L
FVC-PRED: 3.39 L
GLUCOSE SERPL-MCNC: 94 MG/DL (ref 70–99)
HCT VFR BLD AUTO: 41.1 % (ref 35–47)
HDLC SERPL-MCNC: 74 MG/DL
HGB BLD-MCNC: 13.8 G/DL (ref 11.7–15.7)
HIV 1+2 AB+HIV1 P24 AG SERPL QL IA: NONREACTIVE
HOLD SPECIMEN: NORMAL
IMM GRANULOCYTES # BLD: 0 10E3/UL
IMM GRANULOCYTES NFR BLD: 0 %
INTERPRETATION ECG - MUSE: NORMAL
LDLC SERPL CALC-MCNC: 92 MG/DL
LYMPHOCYTES # BLD AUTO: 1.4 10E3/UL (ref 0.8–5.3)
LYMPHOCYTES NFR BLD AUTO: 36 %
MCH RBC QN AUTO: 28 PG (ref 26.5–33)
MCHC RBC AUTO-ENTMCNC: 33.6 G/DL (ref 31.5–36.5)
MCV RBC AUTO: 84 FL (ref 78–100)
MONOCYTES # BLD AUTO: 0.3 10E3/UL (ref 0–1.3)
MONOCYTES NFR BLD AUTO: 9 %
NEUTROPHILS # BLD AUTO: 2.1 10E3/UL (ref 1.6–8.3)
NEUTROPHILS NFR BLD AUTO: 53 %
NONHDLC SERPL-MCNC: 98 MG/DL
NRBC # BLD AUTO: 0 10E3/UL
NRBC BLD AUTO-RTO: 0 /100
P AXIS - MUSE: 71 DEGREES
PLATELET # BLD AUTO: 186 10E3/UL (ref 150–450)
PR INTERVAL - MUSE: 192 MS
QRS DURATION - MUSE: 92 MS
QT - MUSE: 376 MS
QTC - MUSE: 399 MS
R AXIS - MUSE: 63 DEGREES
RBC # BLD AUTO: 4.92 10E6/UL (ref 3.8–5.2)
SYSTOLIC BLOOD PRESSURE - MUSE: NORMAL MMHG
T AXIS - MUSE: 47 DEGREES
TRIGL SERPL-MCNC: 32 MG/DL
TSH SERPL DL<=0.005 MIU/L-ACNC: 3.44 UIU/ML (ref 0.3–4.2)
VENTRICULAR RATE- MUSE: 68 BPM
VIT D+METAB SERPL-MCNC: 33 NG/ML (ref 20–50)
WBC # BLD AUTO: 4 10E3/UL (ref 4–11)

## 2025-01-30 PROCEDURE — 77063 BREAST TOMOSYNTHESIS BI: CPT | Mod: GC | Performed by: STUDENT IN AN ORGANIZED HEALTH CARE EDUCATION/TRAINING PROGRAM

## 2025-01-30 PROCEDURE — 82306 VITAMIN D 25 HYDROXY: CPT | Performed by: INTERNAL MEDICINE

## 2025-01-30 PROCEDURE — 99000 SPECIMEN HANDLING OFFICE-LAB: CPT | Performed by: PATHOLOGY

## 2025-01-30 PROCEDURE — 87389 HIV-1 AG W/HIV-1&-2 AB AG IA: CPT | Performed by: INTERNAL MEDICINE

## 2025-01-30 PROCEDURE — 77067 SCR MAMMO BI INCL CAD: CPT | Mod: GC | Performed by: STUDENT IN AN ORGANIZED HEALTH CARE EDUCATION/TRAINING PROGRAM

## 2025-01-30 PROCEDURE — 77080 DXA BONE DENSITY AXIAL: CPT | Performed by: INTERNAL MEDICINE

## 2025-01-30 RX ORDER — CLOBETASOL PROPIONATE 0.5 MG/G
OINTMENT TOPICAL 2 TIMES DAILY
Qty: 45 G | Refills: 11 | Status: SHIPPED | OUTPATIENT
Start: 2025-01-30

## 2025-01-30 ASSESSMENT — VISUAL ACUITY
METHOD_MR: PT DEFERS MR TODAY
OD_CC: 20/20
OS_CC+: -2
OS_CC: 20/20
METHOD: SNELLEN - LINEAR
CORRECTION_TYPE: CONTACTS

## 2025-01-30 ASSESSMENT — REFRACTION_WEARINGRX
OS_ADD: +1.75
OS_SPHERE: -5.25
OS_AXIS: 009
OD_CYLINDER: +1.00
OD_ADD: +1.75
OD_AXIS: 137
OD_SPHERE: -4.50
OS_CYLINDER: +1.75

## 2025-01-30 ASSESSMENT — PAIN SCALES - GENERAL
PAINLEVEL_OUTOF10: NO PAIN (0)
PAINLEVEL_OUTOF10: NO PAIN (0)

## 2025-01-30 ASSESSMENT — CONF VISUAL FIELD
OD_SUPERIOR_NASAL_RESTRICTION: 0
OS_INFERIOR_TEMPORAL_RESTRICTION: 0
OD_SUPERIOR_TEMPORAL_RESTRICTION: 0
OD_INFERIOR_TEMPORAL_RESTRICTION: 0
OS_SUPERIOR_TEMPORAL_RESTRICTION: 0
OS_NORMAL: 1
OD_INFERIOR_NASAL_RESTRICTION: 0
OS_INFERIOR_NASAL_RESTRICTION: 0
METHOD: COUNTING FINGERS
OS_SUPERIOR_NASAL_RESTRICTION: 0
OD_NORMAL: 1

## 2025-01-30 ASSESSMENT — EXTERNAL EXAM - RIGHT EYE: OD_EXAM: NORMAL

## 2025-01-30 ASSESSMENT — TONOMETRY
IOP_METHOD: ICARE
OD_IOP_MMHG: 20
OS_IOP_MMHG: 17

## 2025-01-30 ASSESSMENT — CUP TO DISC RATIO
OS_RATIO: 0.1
OD_RATIO: 0.1

## 2025-01-30 ASSESSMENT — SLIT LAMP EXAM - LIDS
COMMENTS: TR MGD
COMMENTS: TR MGD

## 2025-01-30 ASSESSMENT — EXTERNAL EXAM - LEFT EYE: OS_EXAM: NORMAL

## 2025-01-30 NOTE — PATIENT INSTRUCTIONS
Brian Chavis,    It was great to meet you today at [facility]. Great job on your fitness tests and thanks for your flexibility with our metabolic cart not functioning.     On the hand  dynamometer, you highest trials were 69 lbs on the right side and 63 lbs on the left, which is slightly higher than your past two visits here. When standardized to your age, sex, and height, you are in the 45th percentile. This is good and reflects the strength training you have been doing. My only recommendation is to swap out the weighs for one strength session per week to make them heavier, ideally to where you can only do 6-8 reps with good form. These heavier days will help to stimulate more muscle growth and to help increase your bone density, both of which typically drop during/after menopause.    Today we estimated your VO2 max at 32.9 ml/kg/min, which places you in the 60th percentile for women your age. This is good and reflects the consistent exercise you have been doing. My only recommendation is to increase the intensity of one of your cardio sessions per week, either by maintaining a moderate to vigorous intensity or by completing an interval workout at a vigorous intensity, with rest intervals in between.    Additionally, make sure you are doing at lest 3 stretching/mobility sessions per week. To help with weight loss, try to think about shifting your routines to include more non-exercise physical activity. Things like taking the stairs, walking breaks, parking farther away from things, and exercise snacks will help with this. Aim to have some movement every 90 mins-2 hours when you are at work or in other sedentary activities. These can be 1-2 minutes but will help you to feel better and increase your daily movement outside of exercise.    Overall, you are doing a great job so far. Keep up the good work and let me know if you have any questions.    Best,  Zina Castro  Exercise  Physiology  [email or web address]

## 2025-01-30 NOTE — OUTPATIENT NURSE NOTE
01/30/25 0844   Fitness   Current Fitness Regimen:  30-45 min workout through [facility]ha  with weights/cardio; treadmill 30 mins @ light intensity, both most days per week   Fitness Goals continue to maintain fitness, lose some weight   Timeline   Recommended Activity this Week continue your current routine, increasing the weight for one strength session and intesnity for one cardio session per week   Recommended Minutes per Day this Week 45 Min   Recommended Number of Days this Week 5 Per Day/Per Week   Recommended Activity this Month continue routine with the high intensities   Recommended Duration this Month 45 Min/Hrs   Recommended Frequency this Month 5 Per Day/Per Week   Recommended Activity the Nex 3 Months continue routine   Recommended Duration the Nex 3 Months 45 Min/Hrs   Recommended Frequency the Nex 3 Months:  5 Per Day/Per Week   VO2 Max   VO2MAX:  32.9 ml/kg/min   VO2- max Percentile 60 %   Fitness Level Good    Strength    Strength (Right):  69 ml/kg/min    Strength (Left) 63 ml/kg/min

## 2025-01-30 NOTE — PROGRESS NOTES
Palmira Caraballo comes into clinic today at the request of Dr. Ben Car Ordering Provider for EKG.    This service provided today was under the supervising provider of the day Dr. Ben Car, who was available if needed.    Laura Jefferson, [facility]

## 2025-01-30 NOTE — NURSING NOTE
Chief Complaints and History of Present Illnesses   Patient presents with    COMPREHENSIVE EYE EXAM     Here for a Comprehensive eye exam      Chief Complaint(s) and History of Present Illness(es)       COMPREHENSIVE EYE EXAM              Associated symptoms: Negative for dryness, eye pain, redness, flashes and floaters    Treatments tried: no treatments    Pain scale: 0/10    Comments: Here for a Comprehensive eye exam               Comments    No noticeable VA changes since last visit.  Happy with comfort/vision in current multifocal contact lenses.   Denies eye pain or discomfort.   No new concerns today.  Ocular Meds: None    Hao Gallo 10:04 AM January 30, 2025

## 2025-01-30 NOTE — NURSING NOTE
Dermatology Rooming Note    Palmira Caraballo's goals for this visit include:   Chief Complaint   Patient presents with    Skin Check     FBSE. No reports spots of concern     Elli Cantor - EMT

## 2025-01-30 NOTE — PROGRESS NOTES
[address] [facility] Outpatient Medical Nutrition Therapy      Time Spent: 20 minutes  Session Type:  follow up  Referral Source: [employer] Package/Dr. Ben Car  Reason for RD Visit:   Nutritional counseling     Nutrition Assessment:    Patient is a 54 y.o. female who is here for follow up annual visit with Registered Dietitian (DAMIÁN).     At initial visit in 7/2022, she stated that she eats 2 meals per day and has 2 cups decaf coffee with 1% milk in the morning and skips breakfast. She initially started this when she was doing intermittent fasting, but she is specifically following this plan any longer. At that time she would eat meals in an 8-hour window during the day and fast/not eat for 16 hours overnight. On weekends she may eat breakfast though. She doesn't eat a lot of vegetables or fruit. She will have some berries at lunch and inconsistently has some vegetables with dinner but not necessarily every night. She does some cooking but reported not a big cook and does not necessarily make full balanced meals. Her kids are home from college so will make some meal for the family. In the summer months, she is better at eating more vegetables as well since her family likes fresh vegetables and do not like frozen. She typically doesn't snack unless very hungry, then has a cheese stick or granola bar. She drinks 2 c decaf coffee with 1% milk, estimated about 48-60 oz water per day. She adds a powdered fruit, veg supplement into one glass water in the morning. She discontinued this for awhile to see if it was really doing anything for her. She did feel more sluggish when she stopped taking it so restarted. She avoids caffeine because she has trouble sleeping through the night and will notice this more with drinking caffeinated beverages so avoids them now. Reviewed her veg-fruit supplement label today which does have some green tea powder. She takes it in the morning and reported in general tea is better  tolerated and doesn't cause her to be more wakeful like coffee or caffeinated diet soda does. She will double check the caffeine content online later as it was not reported on the box. She drinks 1 caffeine free diet coke per week. She will only have 1 glass beer or wine about 1-2 times per week. Overall she doesn't have any specific nutrition goals at this time but interested in general healthful diet education.    At follow up visit on 12/18/23, she stated that she has recently noticed more GI issues/stomach upset which she believes may be triggered by dairy but uncertain. Just in the last week, she has decided to cut it out. She tried lactaid milk on Saturday but then also went out to eat and has a pasta dish with a cream sauce. While she was eating the dish, she had stomach upset. She doesn't drink a lot of alcohol but also recently noticed with having a glass of wine will get a headache right away. Otherwise she hasn't noticed what foods may be causing issues. She was wondering if menopause could play a role with causing some food intolerances. Her whole life she consumed a lot of dairy. When her daughters are home, they like to have hot chocolate at night. She hasn't felt well recently with drinking hot chocolate and believes the milk is the issues. Answered her questions today and discussed low FODMAP diet. She would like to try this diet. Otherwise now that they are empty nesters, she and her  have been eating better and cooking more balanced meals at home. Her dtrs all have different likes and tastes so cooking was more difficult before and now she and her  can make healthier meals for just the two of them.    At follow up visit today on 1/30/25, she stated that she has been feeling well. She didn't end up trying the low FODMAP diet after our last visit, but she realized that she consumed a lot of dairy previously and discovered that she was lactose intolerant. She switched to lactose free  "dairy and decrease her dairy, even lactose free that she consume. Additionally she has been juicing at home and straining out the pulp each morning to have a breakfast and with this has been feeling really well and feels that it is helpful. At lunch she has a Core power shake and individual packet of nuts which fills her up. At dinner she eats a balanced meal that her  makes. She doesn't snack at all. She drinks mostly sparkling water and estimated drinking about 99 oz sparking water per day. She cannot tolerate wine or beer. Wine gives her migraines. She will have 2 cocktails about 2-3 times per month. See diet recall below.    Patient Active Problem List   Diagnosis    NO ACTIVE PROBLEMS    CARDIOVASCULAR SCREENING; LDL GOAL LESS THAN 160    Encounter for IUD insertion    Lichen sclerosus et atrophicus     Estimated body mass index is 24.96 kg/m  as calculated from the following:    Height as of an earlier encounter on 1/30/25: 1.689 m (5' 6.5\").    Weight as of an earlier encounter on 1/30/25: 71.2 kg (157 lb).    Diet Recall:  (some usual meals, snacks and beverages):  Meal Food    Breakfast Skips and just has 2 c decaf coffee black   Lunch Salad or sandwich with fruit or leftovers   Dinner Protein, starch, veg (chicken/turkey/pork/beef/fish, veggies, starch   Snacks Not usually.    Beverages 2 c decaf coffee black, ~64 oz water (24 oz at work and 2 large ~16 oz each glasses at dinner. Prev when kids home, has a cup hot rich at night but discontinued last week   Alcohol Intake Since Oct has only had 1 drink of wine b/c not tolerating it, causes headaches now.      Labs:    Last Comprehensive Metabolic Panel:  Sodium   Date Value Ref Range Status   07/28/2017 138 133 - 144 mmol/L Final     Potassium   Date Value Ref Range Status   07/28/2017 3.9 3.4 - 5.3 mmol/L Final     Chloride   Date Value Ref Range Status   07/28/2017 105 94 - 109 mmol/L Final     Carbon Dioxide   Date Value Ref Range Status "   07/28/2017 26 20 - 32 mmol/L Final     Anion Gap   Date Value Ref Range Status   07/28/2017 7 3 - 14 mmol/L Final     Glucose   Date Value Ref Range Status   01/30/2025 94 70 - 99 mg/dL Final   07/13/2022 84 70 - 99 mg/dL Final   07/28/2017 85 70 - 99 mg/dL Final     Urea Nitrogen   Date Value Ref Range Status   07/28/2017 11 7 - 30 mg/dL Final     Creatinine   Date Value Ref Range Status   01/30/2025 0.81 0.51 - 0.95 mg/dL Final   07/28/2017 0.70 0.52 - 1.04 mg/dL Final     GFR Estimate   Date Value Ref Range Status   01/30/2025 87 >60 mL/min/1.73m2 Final     Comment:     eGFR calculated using 2021 CKD-EPI equation.   07/28/2017 >90  Non  GFR Calc   >60 mL/min/1.7m2 Final     Calcium   Date Value Ref Range Status   07/28/2017 9.0 8.5 - 10.1 mg/dL Final       CBC RESULTS:   Recent Labs   Lab Test 07/13/22  0718   WBC 4.3   RBC 4.78   HGB 13.5   HCT 41.5   MCV 87   MCH 28.2   MCHC 32.5   RDW 12.6        Pertinent Medications/vitamin and mineral supplements:     Current Outpatient Medications   Medication Sig Dispense Refill    clobetasol (TEMOVATE) 0.05 % external ointment Apply topically 2 times daily as need for itching.  Notify provider if symptoms don't resolve within 2 weeks. 45 g 4    levonorgestrel (MIRENA) 20 MCG/24HR IUD 1 each (20 mcg) by Intrauterine route once      multivitamin w/minerals (MULTI-VITAMIN) tablet Take 1 tablet by mouth daily (Patient not taking: Reported on 1/21/2025)       No current facility-administered medications for this visit.     Food Allergies:  NKFA    Physical Activity: walks on her treadmill 30 minutes everyday and does ~6 days per week does cardio and weights/strength.    Nutrition Prescription: nutrition education    Nutrition Intervention:    Nutrition Education/Counseling:  Reviewed overall general healthful diet tips.    Educational Materials Provided:  [employer] Daily Nutrition Plate method handout     Goals:    Continue following a lactose free  diet as tolerated.    Continue eating 3 meals per day.    Okay to continue to replace lunch with a protein drink. Recommend having a side of vegetables or fruit with your lunch.    Increase intake of non carbonated water. For example, you could make half of your usual water non carbonated.    Nutrition Monitoring and Evaluation: Will monitor adherence to nutrition recommendations at future RD visits.     Further Medical Nutrition Therapy: annual visits    Patient was encouraged to call/contact RD with any further questions.    Isidra Bhatti MS, RD, LD

## 2025-01-30 NOTE — PROGRESS NOTES
[facility] Dermatology Note    Encounter Date: Jan 30, 2025    Dermatology Problem List:    ______________________________________    Impression/Plan:  Palmira was seen today for skin check.    Diagnoses and all orders for this visit:        Lichen sclerosus et atrophicus  -     clobetasol (TEMOVATE) 0.05 % external ointment; Apply topically 2 times daily. as need for itching.  Notify provider if symptoms don't resolve within 2 weeks.  - continue BID PRN   - discussed protopic, declines for now  - does occasionally have dysparenuria     Multiple benign nevi  Actinic skin damage  Lentigines  - Reviewed the compounding benefits of incremental changes to sun protective clothing behaviors including increased frequency of sunscreen and sun protective clothing like broad brimmed hats and longsleeved UPF containing clothing          Follow-up in 1 year.       Staff Involved:  Staff Only    Reji Lowe MD   of Dermatology  Department of Dermatology  [facility]      CC:   Chief Complaint   Patient presents with    Skin Check     FBSE. No reports spots of concern       History of Present Illness:  Ms. Palmira Caraballo is a 54 year old female who presents as a return patient.    Pt presents today for concerns about spots on trunk and extremities. Uses clobetasol intermittently to good effect. Does occasionally have pain w/ intercourse.       Labs:      Physical exam:  Vitals: There were no vitals taken for this visit.  GEN: well developed, well-nourished, in no acute distress, in a pleasant mood.     SKIN: Dhillon phototype 1  - Full skin, which includes the head/face, both arms, chest, back, abdomen,both legs, genitalia and/or groin buttocks, digits and/or nails, was examined.  - Flat brown macules and patches in a sun exposed areas on face and extremities  - scattered brown papules on trunk and extremities   - No other lesions of concern on areas  examined.     Past Medical History:   Past Medical History:   Diagnosis Date    NO ACTIVE PROBLEMS      Past Surgical History:   Procedure Laterality Date    COLONOSCOPY N/A 12/9/2022    Procedure: COLONOSCOPY, WITH POLYPECTOMY;  Surgeon: Ashwin Mcmahon MD;  Location: [facility] OR    NO HISTORY OF SURGERY         Social History:   reports that she has never smoked. She has never used smokeless tobacco. She reports current alcohol use. She reports that she does not use drugs.    Family History:  Family History   Problem Relation Age of Onset    Cerebrovascular Disease Mother     Hypertension Mother     Other - See Comments Mother         corticobasal degeneration and ataxia    Breast Cancer Maternal Grandmother     Aneurysm Maternal Grandfather         50s    Skin Cancer No family hx of        Medications:  Current Outpatient Medications   Medication Sig Dispense Refill    clobetasol (TEMOVATE) 0.05 % external ointment Apply topically 2 times daily. as need for itching.  Notify provider if symptoms don't resolve within 2 weeks. 45 g 11    levonorgestrel (MIRENA) 20 MCG/24HR IUD 1 each (20 mcg) by Intrauterine route once       Allergies   Allergen Reactions    No Known Allergies

## 2025-01-30 NOTE — LETTER
1/30/2025       RE: Palmira Caraballo  [address] [place] [address]     Dear Colleague,    Thank you for referring your patient, Palmira Caraballo, to the [facility] DERMATOLOGY CLINIC [place] [facility]. Please see a copy of my visit note below.    [facility] Dermatology Note    Encounter Date: Jan 30, 2025    Dermatology Problem List:    ______________________________________    Impression/Plan:  Palmira was seen today for skin check.    Diagnoses and all orders for this visit:        Lichen sclerosus et atrophicus  -     clobetasol (TEMOVATE) 0.05 % external ointment; Apply topically 2 times daily. as need for itching.  Notify provider if symptoms don't resolve within 2 weeks.  - continue BID PRN   - discussed protopic, declines for now  - does occasionally have dysparenuria     Multiple benign nevi  Actinic skin damage  Lentigines  - Reviewed the compounding benefits of incremental changes to sun protective clothing behaviors including increased frequency of sunscreen and sun protective clothing like broad brimmed hats and longsleeved UPF containing clothing          Follow-up in 1 year.       Staff Involved:  Staff Only    Reji Lowe MD   of Dermatology  Department of Dermatology  [facility]      CC:   Chief Complaint   Patient presents with     Skin Check     FBSE. No reports spots of concern       History of Present Illness:  Ms. Palmira Caraballo is a 54 year old female who presents as a return patient.    Pt presents today for concerns about spots on trunk and extremities. Uses clobetasol intermittently to good effect. Does occasionally have pain w/ intercourse.       Labs:      Physical exam:  Vitals: There were no vitals taken for this visit.  GEN: well developed, well-nourished, in no acute distress, in a pleasant mood.     SKIN: Dhillon phototype 1  - Full  skin, which includes the head/face, both arms, chest, back, abdomen,both legs, genitalia and/or groin buttocks, digits and/or nails, was examined.  - Flat brown macules and patches in a sun exposed areas on face and extremities  - scattered brown papules on trunk and extremities   - No other lesions of concern on areas examined.     Past Medical History:   Past Medical History:   Diagnosis Date     NO ACTIVE PROBLEMS      Past Surgical History:   Procedure Laterality Date     COLONOSCOPY N/A 12/9/2022    Procedure: COLONOSCOPY, WITH POLYPECTOMY;  Surgeon: Ashwin Mcmahon MD;  Location: [facility] OR     NO HISTORY OF SURGERY         Social History:   reports that she has never smoked. She has never used smokeless tobacco. She reports current alcohol use. She reports that she does not use drugs.    Family History:  Family History   Problem Relation Age of Onset     Cerebrovascular Disease Mother      Hypertension Mother      Other - See Comments Mother         corticobasal degeneration and ataxia     Breast Cancer Maternal Grandmother      Aneurysm Maternal Grandfather         50s     Skin Cancer No family hx of        Medications:  Current Outpatient Medications   Medication Sig Dispense Refill     clobetasol (TEMOVATE) 0.05 % external ointment Apply topically 2 times daily. as need for itching.  Notify provider if symptoms don't resolve within 2 weeks. 45 g 11     levonorgestrel (MIRENA) 20 MCG/24HR IUD 1 each (20 mcg) by Intrauterine route once       Allergies   Allergen Reactions     No Known Allergies                Again, thank you for allowing me to participate in the care of your patient.      Sincerely,    Reji Lowe MD

## 2025-01-30 NOTE — LETTER
1/30/2025      Palmira Caraballo  [address]  [place] [address]      Dear Colleague,    Thank you for referring your patient, Palmira Caraballo, to the [facility] GASTROENTEROLOGY CLINIC [place]. Please see a copy of my visit note below.    [facility] Outpatient Medical Nutrition Therapy      Time Spent: 20 minutes  Session Type:  follow up  Referral Source: [employer] Package/Dr. Ben Car  Reason for RD Visit:   Nutritional counseling     Nutrition Assessment:    Patient is a 54 y.o. female who is here for follow up annual visit with Registered Dietitian (RD).     At initial visit in 7/2022, she stated that she eats 2 meals per day and has 2 cups decaf coffee with 1% milk in the morning and skips breakfast. She initially started this when she was doing intermittent fasting, but she is specifically following this plan any longer. At that time she would eat meals in an 8-hour window during the day and fast/not eat for 16 hours overnight. On weekends she may eat breakfast though. She doesn't eat a lot of vegetables or fruit. She will have some berries at lunch and inconsistently has some vegetables with dinner but not necessarily every night. She does some cooking but reported not a big cook and does not necessarily make full balanced meals. Her kids are home from college so will make some meal for the family. In the summer months, she is better at eating more vegetables as well since her family likes fresh vegetables and do not like frozen. She typically doesn't snack unless very hungry, then has a cheese stick or granola bar. She drinks 2 c decaf coffee with 1% milk, estimated about 48-60 oz water per day. She adds a powdered fruit, veg supplement into one glass water in the morning. She discontinued this for awhile to see if it was really doing anything for her. She did feel more sluggish when she stopped taking it so restarted. She avoids caffeine because she has trouble  sleeping through the night and will notice this more with drinking caffeinated beverages so avoids them now. Reviewed her veg-fruit supplement label today which does have some green tea powder. She takes it in the morning and reported in general tea is better tolerated and doesn't cause her to be more wakeful like coffee or caffeinated diet soda does. She will double check the caffeine content online later as it was not reported on the box. She drinks 1 caffeine free diet coke per week. She will only have 1 glass beer or wine about 1-2 times per week. Overall she doesn't have any specific nutrition goals at this time but interested in general healthful diet education.    At follow up visit on 12/18/23, she stated that she has recently noticed more GI issues/stomach upset which she believes may be triggered by dairy but uncertain. Just in the last week, she has decided to cut it out. She tried lactaid milk on Saturday but then also went out to eat and has a pasta dish with a cream sauce. While she was eating the dish, she had stomach upset. She doesn't drink a lot of alcohol but also recently noticed with having a glass of wine will get a headache right away. Otherwise she hasn't noticed what foods may be causing issues. She was wondering if menopause could play a role with causing some food intolerances. Her whole life she consumed a lot of dairy. When her daughters are home, they like to have hot chocolate at night. She hasn't felt well recently with drinking hot chocolate and believes the milk is the issues. Answered her questions today and discussed low FODMAP diet. She would like to try this diet. Otherwise now that they are empty nesters, she and her  have been eating better and cooking more balanced meals at home. Her dtrs all have different likes and tastes so cooking was more difficult before and now she and her  can make healthier meals for just the two of them.    At follow up visit today on  "1/30/25, she stated that she has been feeling well. She didn't end up trying the low FODMAP diet after our last visit, but she realized that she consumed a lot of dairy previously and discovered that she was lactose intolerant. She switched to lactose free dairy and decrease her dairy, even lactose free that she consume. Additionally she has been juicing at home and straining out the pulp each morning to have a breakfast and with this has been feeling really well and feels that it is helpful. At lunch she has a Core power shake and individual packet of nuts which fills her up. At dinner she eats a balanced meal that her  makes. She doesn't snack at all. She drinks mostly sparkling water and estimated drinking about 99 oz sparking water per day. She cannot tolerate wine or beer. Wine gives her migraines. She will have 2 cocktails about 2-3 times per month. See diet recall below.    Patient Active Problem List   Diagnosis     NO ACTIVE PROBLEMS     CARDIOVASCULAR SCREENING; LDL GOAL LESS THAN 160     Encounter for IUD insertion     Lichen sclerosus et atrophicus     Estimated body mass index is 24.96 kg/m  as calculated from the following:    Height as of an earlier encounter on 1/30/25: 1.689 m (5' 6.5\").    Weight as of an earlier encounter on 1/30/25: 71.2 kg (157 lb).    Diet Recall:  (some usual meals, snacks and beverages):  Meal Food    Breakfast Skips and just has 2 c decaf coffee black   Lunch Salad or sandwich with fruit or leftovers   Dinner Protein, starch, veg (chicken/turkey/pork/beef/fish, veggies, starch   Snacks Not usually.    Beverages 2 c decaf coffee black, ~64 oz water (24 oz at work and 2 large ~16 oz each glasses at dinner. Prev when kids home, has a cup hot rich at night but discontinued last week   Alcohol Intake Since Oct has only had 1 drink of wine b/c not tolerating it, causes headaches now.      Labs:    Last Comprehensive Metabolic Panel:  Sodium   Date Value Ref Range Status "   07/28/2017 138 133 - 144 mmol/L Final     Potassium   Date Value Ref Range Status   07/28/2017 3.9 3.4 - 5.3 mmol/L Final     Chloride   Date Value Ref Range Status   07/28/2017 105 94 - 109 mmol/L Final     Carbon Dioxide   Date Value Ref Range Status   07/28/2017 26 20 - 32 mmol/L Final     Anion Gap   Date Value Ref Range Status   07/28/2017 7 3 - 14 mmol/L Final     Glucose   Date Value Ref Range Status   01/30/2025 94 70 - 99 mg/dL Final   07/13/2022 84 70 - 99 mg/dL Final   07/28/2017 85 70 - 99 mg/dL Final     Urea Nitrogen   Date Value Ref Range Status   07/28/2017 11 7 - 30 mg/dL Final     Creatinine   Date Value Ref Range Status   01/30/2025 0.81 0.51 - 0.95 mg/dL Final   07/28/2017 0.70 0.52 - 1.04 mg/dL Final     GFR Estimate   Date Value Ref Range Status   01/30/2025 87 >60 mL/min/1.73m2 Final     Comment:     eGFR calculated using 2021 CKD-EPI equation.   07/28/2017 >90  Non  GFR Calc   >60 mL/min/1.7m2 Final     Calcium   Date Value Ref Range Status   07/28/2017 9.0 8.5 - 10.1 mg/dL Final       CBC RESULTS:   Recent Labs   Lab Test 07/13/22  0718   WBC 4.3   RBC 4.78   HGB 13.5   HCT 41.5   MCV 87   MCH 28.2   MCHC 32.5   RDW 12.6        Pertinent Medications/vitamin and mineral supplements:     Current Outpatient Medications   Medication Sig Dispense Refill     clobetasol (TEMOVATE) 0.05 % external ointment Apply topically 2 times daily as need for itching.  Notify provider if symptoms don't resolve within 2 weeks. 45 g 4     levonorgestrel (MIRENA) 20 MCG/24HR IUD 1 each (20 mcg) by Intrauterine route once       multivitamin w/minerals (MULTI-VITAMIN) tablet Take 1 tablet by mouth daily (Patient not taking: Reported on 1/21/2025)       No current facility-administered medications for this visit.     Food Allergies:  NKFA    Physical Activity: walks on her treadmill 30 minutes everyday and does ~6 days per week does cardio and weights/strength.    Nutrition Prescription:  nutrition education    Nutrition Intervention:    Nutrition Education/Counseling:  Reviewed overall general healthful diet tips.    Educational Materials Provided:  [employer] Daily Nutrition Plate method handout     Goals:    Continue following a lactose free diet as tolerated.    Continue eating 3 meals per day.    Okay to continue to replace lunch with a protein drink. Recommend having a side of vegetables or fruit with your lunch.    Increase intake of non carbonated water. For example, you could make half of your usual water non carbonated.    Nutrition Monitoring and Evaluation: Will monitor adherence to nutrition recommendations at future RD visits.     Further Medical Nutrition Therapy: annual visits    Patient was encouraged to call/contact RD with any further questions.    Isidra Bhatti MS, RD, LD          Again, thank you for allowing me to participate in the care of your patient.        Sincerely,        Isidra Bhatti RD    Electronically signed

## 2025-01-30 NOTE — LETTER
2025       RE: Palmira Caraballo  [address] [place] [address]     Dear Colleague,    Thank you for referring your patient, Palmira Caraballo, to the [facility]. Please see a copy of my visit note below.    History and Physical Examination     SUBJECTIVE: Chief concern: preventive health review.     Past Medical History:  1.   3 para 3, with normal spontaneous vaginal deliveries in 2000, 10/2002, and 2005.  2.  History of minimal subclinical hypothyroidism  3.  Impaired fasting glucose  4.  History of narrow airway with snoring  5.  Raynaud's phenomenon  6.  Lichen sclerosis et atrophicus  7.  History of microscopic hematuria with negative evaluation at [facility], 10/2019     Adverse Drug Reactions: None.     Current Medications:     Levonorgestrol, 20 mcg/24 hour IUD.  Clobetasol 0.05%, applied twice daily as needed for itching.     Habits:  Tobacco: Never  Alcohol: 2 servings, 2-3 days per month  Caffeine: None.  Cannabis/recreational drugs: Rare use of oral CBD for insomnia     Social History:  to Fredrick, whom she met in high school, and mother of 3 daughters: Prudence, age 24, a [employer] alum who works in [employer] in [place]; Mindy, age 22, a senior in the [employer] at the [employer] who plans to attend [employer]; and Josefa, age 20, who attends the [employer].  Palmira was born in [place] and lived in [place] and [place] before moving to [place] at age 12, when her father was transferred to the headquarters of the [employer].  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  She describes a favorable work-life balance away from work, she enjoys family activities, hosting dinner parties, attending theater, reading, and exercise.  She exercises for at least 40 minutes daily, following the [employer] program that includes aerobic and strength training; over the past 3 months, she has included low-moderate intensity treadmill workouts 1-2 days/week.     Family History: Mother  in  at age 75 from complications of cortical basal degeneration and apraxia, with history of hypertension and TIA at age 65. Father has a history of colonic polyps at age 80.  A sister 2 years her senior has osteoporosis and a history of colonic polyps.  A brother 4 years her selam is in good health.  Maternal grandmother was diagnosed with breast cancer in her 50s and  at age 90.  Maternal grandfather  from an unspecified aneurysm in his 50s.  Paternal grandmother  in her late 80s to early 90s.  Paternal grandfather  in his late 80s to 90s, with possible history of colon cancer.  Daughters are in good health.     Review of Systems: Ongoing symptoms of perimenopause, improved from , currently primarily vasomotor symptoms with heat intolerance and diaphoresis, especially at night.  She typically goes to bed at 9:30 PM and rises at 5:30 AM with a continued pattern of sleeping for approximately 4 hours after going to bed, then awakening frequently, often related to vasomotor symptoms and mild diaphoresis followed by ruminations and ild anxiety.  She notes occasional mild snoring, without unrefreshed sensation upon awakening or daytime somnolence; her  has not observed loud snoring, gasping or snorting, or pauses in breathing during sleep.  Previous symptoms of intermittent abdominal bloating with nausea and gas subside when she switched to lactose-free dairy products; she believes that  "symptoms also seemed to improve as she added a daily morning smoothie containing apples, oranges, cucumbers, carrots, bok gómez, and sebastian.  Palmira is followed regularly by an outside gynecologist; cervical cancer screening was negative in 8/2023.  Colonoscopy was completed in 12/2022, with findings of a hyperplastic polyp versus lymphoid aggregate.  COVID vaccinations were administered in 3/2021, 4/2021, and 10/2021, 7/2022, and 11/2023.  Tetanus (Td) was administered in 10/2019; Tdap was administered in 2009.  Recombinant zoster vaccination series was completed in 9/2022.  No history of hepatitis B or pneumococcal vaccination.  Remainder of complete review of systems was negative.       OBJECTIVE:     Vital signs: Height 66.5 inches.  Weight 157 pounds.  Blood pressure 108/73 on average of 3 automated readings.  Heart rate 57.  Respiratory rate 16.  Temperature 97.8 degrees.  O2 saturation 98% on room air.    General: Alert, neatly dressed and groomed, in no acute distress.  HEENT: Atraumatic and normocephalic. Eyelids, pupils, and conjunctivae appeared normal. Lips, teeth and gums appear normal.  Oropharynx showed moist mucous membranes, without exudate or erythema. Somewhat \"crowded\" soft palate with relatively narrow airway.    Neck: Supple, without thyromegaly, mass, or bruit. No cervical or supraclavicular lymphadenopathy.  Back: No spinal or costovertebral angle tenderness.  Chest: Clear to auscultation and percussion. Normal respiratory effort.  Cardiovascular: No jugular venous distention. Regular rate and rhythm, normal S1, S2 without murmur.  Abdomen: Bowel sounds positive; soft, nontender, without rebound, guarding, hepatosplenomegaly or mass.  Extremities: No cyanosis or edema. Cool fingers, without pallor or cyanosis.  Normal pulses throughout.    Breasts: Examination was completed in the company of Laura Ann CMA.  Breasts were symmetric, without dimpling.  Bilateral palpation showed dense " "tissue, without dominant mass, discharge, or lymphadenopathy.  Skin: Examination was deferred; full evaluation was completed earlier in day through dermatology clinic.  Neurologic: Cranial nerves II-XII were grossly intact. Sensory and motor examinations were normal. Normal gait.  Mini-cog score was 5/5.  Psychiatric: Alert and oriented ×3. Normal affect. Judgment and insight intact.  PHQ-2 score was 0.    Creatinine 0.1, alkaline phosphatase 72, ALT 1, cholesterol 172, triglycerides 32, HDL 74, LDL 92, cholesterol/HDL 2.2, glucose 94, TSH 3.44, 25-hydroxy vitamin D 33, with blood cell count 4000, hemoglobin 13.8, platelets 186,000, HIV nonreactive.     EKG showed a normal sinus rhythm with changes consistent with early repolarization.  Spirometry showed an FEV1 of 3.45, with an FVC of 4.10; readings were 126% and 121% of predicted values, respectively.     An audiology evaluation showed normal hearing bilaterally.    Mammography showed heterogeneously dense breasts, with no radiographic evidence of malignancy; impression: ACR BI-RADS category 1: Negative.     DEXA showed normal bone density, with most negative and valid Z-score of 1.0 at the left total hip..       Body composition analysis showed 41.0% fat (76th percentile); body mass index was 25.0.  A reading from 12/2023 showed 41.8% fat (80th percentile); a reading from 7/2022 showed 35.9% fat (55th percentile).      ASSESSMENT:     1.  Insomnia.  Palmira describes minimal insomnia, apparently related to perimenopausal symptoms coupled with mild anxiety.  She was advised to follow usual \"sleep hygiene\" measures, which we reviewed in detail.  I recommended that she jot down any worrying thoughts that arise at night, to be addressed during a designated \"worrying time\" early each day.  We discussed the potential role of melatonin, 0.3-0.5 mg nightly.  I emphasized the importance of pursuing sleep clinic evaluation in the event of daytime somnolence or unrefreshed " sensation upon awakening despite adequate sleep volume, or in the event that her  observes loud snoring, gasping or snorting, or pauses in breathing during sleep.  She agrees to pursue further evaluation in the event of progressive or persistent symptoms.     2.  Perimenopausal symptoms.  Palmira reports his symptoms have improved somewhat relative to her previous visit.  She has reversed a previous trend of weight gain with a commitment to more intense strength training and additional subthreshold exercise.  She was advised of available alternatives for pharmacologic management if her symptoms progress or become more bothersome.     3.  History of abdominal symptoms.  Presumably related to lactose intolerance; resolved following modification of diet.     4.  History of subclinical hypothyroidism.  Current TSH level is normal.  Previous pattern of weight gain coincided with perimenopausal symptoms and has now stabilized.     5.  Impaired fasting glucose.  Current glucose level is acceptable.  We discussed the implications of this diagnosis, along with the importance of maintaining ideal weight, while continuing a regimen of regular exercise and following a modified diet.       6.  Increased body fat percentage.  Improved relative to 12/2023.  We reviewed diet and exercise strategies for building muscle mass and reducing body weight, including the potential role of 16/8 intermittent fasting.  We reviewed the roles of various types of exercise, including the muscle building and fat-burning benefits of strength training, the fat-burning effect of subthreshold exercise, and the time economy associated with high-intensity interval training.     7.  Preventive care.  The American Heart Association PREVENT algorithm estimates her 10-year/30-year risks as follows: CVD, 1.3% / 9.6%; ASCVD, 0.8% / 5.0%; and heart failure, 0.7% / 6.3%.  We reviewed the thresholds at which pharmacologic treatment is recommended and the  lifestyle measures that can help optimize heart health.  Colonoscopy was completed in 12/2022.  To bring her immunization status up-to-date, I recommended influenza vaccination, COVID vaccination boosters per CDC guidelines, a hepatitis B vaccination series and a PCV 21 pneumococcal vaccination, all of which she agreed to consider and schedule on her own.  She was advised to use a vitamin D3 supplement, 25 mcg daily, while maintaining daily calcium intake of 1200 mg, preferably from dietary sources.       PLAN: See above.     ~SRT      Again, thank you for allowing me to participate in the care of your patient.      Sincerely,    Ben Car MD

## 2025-01-30 NOTE — PROGRESS NOTES
History  HPI       COMPREHENSIVE EYE EXAM    Associated symptoms include Negative for dryness, eye pain, redness, flashes and floaters.  Treatments tried include no treatments.  Pain was noted as 0/10. Additional comments: Here for a Comprehensive eye exam              Comments    No noticeable VA changes since last visit.  Happy with comfort/vision in current multifocal contact lenses.   Denies eye pain or discomfort.   No new concerns today.  Ocular Meds: Maday Gallo 10:04 AM January 30, 2025            Last edited by Hao Gallo on 1/30/2025 10:04 AM.          Assessment/Plan  (H02.88A,  H02.88B) Meibomian gland dysfunction (MGD) of upper and lower lids of both eyes  (primary encounter diagnosis)  Comment: Asymptomatic  Plan:  Educated patient on clinical findings. Recommended warm compresses as needed for comfort. Monitor annually.    (H52.13) Myopia of both eyes  Comment: Not assessed at this visit, happy with glasses, trying monovision contact lenses for the first time  Plan:    Monitor as needed.    Return to clinic in 1 year for comprehensive eye exam.    Complete documentation of historical and exam elements from today's encounter can  be found in the full encounter summary report (not reduplicated in this progress  note). I personally obtained the chief complaint(s) and history of present illness. I  confirmed and edited as necessary the review of systems, past medical/surgical  history, family history, social history, and examination findings as documented by  others; and I examined the patient myself. I personally reviewed the relevant tests,  images, and reports as documented above. I formulated and edited as necessary the  assessment and plan and discussed the findings and management plan with the  patient and family.    Javed Sousa, YOMI, FAAO

## 2025-01-30 NOTE — PROGRESS NOTES
AUDIOLOGY REPORT  [facility]    SUMMARY: Audiology visit completed. See audiogram for results.      RECOMMENDATIONS: Follow-up with referring provider. Repeat hearing evaluation in 3-5 years, sooner if concerns      Sadie Turcios  Audiologist  [place] License  #[id number]

## 2025-01-30 NOTE — NURSING NOTE
AHA BP    1st   107/72  2nd  108/73  3rd   110/73    Average  108/73  Laura Ann CMA 7:43 AM on 1/30/2025

## 2025-01-30 NOTE — PATIENT INSTRUCTIONS
It was nice speaking with you today. Below are the nutrition recommendations we discussed at your visit.    Please let me know if you have any additional questions.    Nutrition Recommendations    Continue following a lactose free diet as tolerated.    Continue eating 3 meals per day.    Okay to continue to replace lunch with a protein drink. Recommend having a side of vegetables or fruit with your lunch.    Increase intake of non carbonated water. For example, you could make half of your usual water non carbonated.    Thank you,    Isidra Bhatti, MS, RD, LD

## 2025-01-31 NOTE — PROGRESS NOTES
History and Physical Examination     SUBJECTIVE: Chief concern: preventive health review.     Past Medical History:  1.   3 para 3, with normal spontaneous vaginal deliveries in 2000, 10/2002, and 2005.  2.  History of minimal subclinical hypothyroidism  3.  Impaired fasting glucose  4.  History of narrow airway with snoring  5.  Raynaud's phenomenon  6.  Lichen sclerosis et atrophicus  7.  History of microscopic hematuria with negative evaluation at [facility], 10/2019     Adverse Drug Reactions: None.     Current Medications:     Levonorgestrol, 20 mcg/24 hour IUD.  Clobetasol 0.05%, applied twice daily as needed for itching.     Habits:  Tobacco: Never  Alcohol: 2 servings, 2-3 days per month  Caffeine: None.  Cannabis/recreational drugs: Rare use of oral CBD for insomnia     Social History:  to Fredrick, whom she met in high school, and mother of 3 daughters: Prudence, age 24, a [employer] alum who works in [employer] in [place]; Mindy, age 22, a senior in the [employer] at the [employer] who plans to attend [employer] school; and Josefa, age 20, who attends the [employer].  Palmira was born in [place] and lived in [place] and [place] before moving to [place] at age 12, when her father was transferred to the headquarters of the [employer].  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  She describes a favorable work-life balance away from work, she enjoys family activities, hosting dinner parties, attending theater, reading, and exercise.  She exercises for at least 40 minutes daily, following the [employer] program  that includes aerobic and strength training; over the past 3 months, she has included low-moderate intensity treadmill workouts 1-2 days/week.     Family History: Mother  in  at age 75 from complications of cortical basal degeneration and apraxia, with history of hypertension and TIA at age 65. Father has a history of colonic polyps at age 80.  A sister 2 years her senior has osteoporosis and a history of colonic polyps.  A brother 4 years her selam is in good health.  Maternal grandmother was diagnosed with breast cancer in her 50s and  at age 90.  Maternal grandfather  from an unspecified aneurysm in his 50s.  Paternal grandmother  in her late 80s to early 90s.  Paternal grandfather  in his late 80s to 90s, with possible history of colon cancer.  Daughters are in good health.     Review of Systems: Ongoing symptoms of perimenopause, improved from , currently primarily vasomotor symptoms with heat intolerance and diaphoresis, especially at night.  She typically goes to bed at 9:30 PM and rises at 5:30 AM with a continued pattern of sleeping for approximately 4 hours after going to bed, then awakening frequently, often related to vasomotor symptoms and mild diaphoresis followed by ruminations and ild anxiety.  She notes occasional mild snoring, without unrefreshed sensation upon awakening or daytime somnolence; her  has not observed loud snoring, gasping or snorting, or pauses in breathing during sleep.  Previous symptoms of intermittent abdominal bloating with nausea and gas subside when she switched to lactose-free dairy products; she believes that symptoms also seemed to improve as she added a daily morning smoothie containing apples, oranges, cucumbers, carrots, bok gómez, and sebastian.  Palmira is followed regularly by an outside gynecologist; cervical cancer screening was negative in 2023.  Colonoscopy was completed in 2022, with findings of a hyperplastic polyp  "versus lymphoid aggregate.  COVID vaccinations were administered in 3/2021, 4/2021, and 10/2021, 7/2022, and 11/2023.  Tetanus (Td) was administered in 10/2019; Tdap was administered in 2009.  Recombinant zoster vaccination series was completed in 9/2022.  No history of hepatitis B or pneumococcal vaccination.  Remainder of complete review of systems was negative.       OBJECTIVE:     Vital signs: Height 66.5 inches.  Weight 157 pounds.  Blood pressure 108/73 on average of 3 automated readings.  Heart rate 57.  Respiratory rate 16.  Temperature 97.8 degrees.  O2 saturation 98% on room air.    General: Alert, neatly dressed and groomed, in no acute distress.  HEENT: Atraumatic and normocephalic. Eyelids, pupils, and conjunctivae appeared normal. Lips, teeth and gums appear normal.  Oropharynx showed moist mucous membranes, without exudate or erythema. Somewhat \"crowded\" soft palate with relatively narrow airway.    Neck: Supple, without thyromegaly, mass, or bruit. No cervical or supraclavicular lymphadenopathy.  Back: No spinal or costovertebral angle tenderness.  Chest: Clear to auscultation and percussion. Normal respiratory effort.  Cardiovascular: No jugular venous distention. Regular rate and rhythm, normal S1, S2 without murmur.  Abdomen: Bowel sounds positive; soft, nontender, without rebound, guarding, hepatosplenomegaly or mass.  Extremities: No cyanosis or edema. Cool fingers, without pallor or cyanosis.  Normal pulses throughout.    Breasts: Examination was completed in the company of Laura Ann CMA.  Breasts were symmetric, without dimpling.  Bilateral palpation showed dense tissue, without dominant mass, discharge, or lymphadenopathy.  Skin: Examination was deferred; full evaluation was completed earlier in day through dermatology clinic.  Neurologic: Cranial nerves II-XII were grossly intact. Sensory and motor examinations were normal. Normal gait.  Mini-cog score was 5/5.  Psychiatric: Alert and " "oriented ×3. Normal affect. Judgment and insight intact.  PHQ-2 score was 0.    Creatinine 0.1, alkaline phosphatase 72, ALT 1, cholesterol 172, triglycerides 32, HDL 74, LDL 92, cholesterol/HDL 2.2, glucose 94, TSH 3.44, 25-hydroxy vitamin D 33, with blood cell count 4000, hemoglobin 13.8, platelets 186,000, HIV nonreactive.     EKG showed a normal sinus rhythm with changes consistent with early repolarization.  Spirometry showed an FEV1 of 3.45, with an FVC of 4.10; readings were 126% and 121% of predicted values, respectively.     An audiology evaluation showed normal hearing bilaterally.    Mammography showed heterogeneously dense breasts, with no radiographic evidence of malignancy; impression: ACR BI-RADS category 1: Negative.     DEXA showed normal bone density, with most negative and valid Z-score of 1.0 at the left total hip..       Body composition analysis showed 41.0% fat (76th percentile); body mass index was 25.0.  A reading from 12/2023 showed 41.8% fat (80th percentile); a reading from 7/2022 showed 35.9% fat (55th percentile).      ASSESSMENT:     1.  Insomnia.  Palmira describes minimal insomnia, apparently related to perimenopausal symptoms coupled with mild anxiety.  She was advised to follow usual \"sleep hygiene\" measures, which we reviewed in detail.  I recommended that she jot down any worrying thoughts that arise at night, to be addressed during a designated \"worrying time\" early each day.  We discussed the potential role of melatonin, 0.3-0.5 mg nightly.  I emphasized the importance of pursuing sleep clinic evaluation in the event of daytime somnolence or unrefreshed sensation upon awakening despite adequate sleep volume, or in the event that her  observes loud snoring, gasping or snorting, or pauses in breathing during sleep.  She agrees to pursue further evaluation in the event of progressive or persistent symptoms.     2.  Perimenopausal symptoms.  Palmira reports his symptoms have " improved somewhat relative to her previous visit.  She has reversed a previous trend of weight gain with a commitment to more intense strength training and additional subthreshold exercise.  She was advised of available alternatives for pharmacologic management if her symptoms progress or become more bothersome.     3.  History of abdominal symptoms.  Presumably related to lactose intolerance; resolved following modification of diet.     4.  History of subclinical hypothyroidism.  Current TSH level is normal.  Previous pattern of weight gain coincided with perimenopausal symptoms and has now stabilized.     5.  Impaired fasting glucose.  Current glucose level is acceptable.  We discussed the implications of this diagnosis, along with the importance of maintaining ideal weight, while continuing a regimen of regular exercise and following a modified diet.       6.  Increased body fat percentage.  Improved relative to 12/2023.  We reviewed diet and exercise strategies for building muscle mass and reducing body weight, including the potential role of 16/8 intermittent fasting.  We reviewed the roles of various types of exercise, including the muscle building and fat-burning benefits of strength training, the fat-burning effect of subthreshold exercise, and the time economy associated with high-intensity interval training.     7.  Preventive care.  The American Heart Association PREVENT algorithm estimates her 10-year/30-year risks as follows: CVD, 1.3% / 9.6%; ASCVD, 0.8% / 5.0%; and heart failure, 0.7% / 6.3%.  We reviewed the thresholds at which pharmacologic treatment is recommended and the lifestyle measures that can help optimize heart health.  Colonoscopy was completed in 12/2022.  To bring her immunization status up-to-date, I recommended influenza vaccination, COVID vaccination boosters per CDC guidelines, a hepatitis B vaccination series and a PCV 21 pneumococcal vaccination, all of which she agreed to consider  and schedule on her own.  She was advised to use a vitamin D3 supplement, 25 mcg daily, while maintaining daily calcium intake of 1200 mg, preferably from dietary sources.       PLAN: See above.     ~SRT

## (undated) DEVICE — TUBING SUCTION 12"X1/4" N612

## (undated) DEVICE — SUCTION MANIFOLD NEPTUNE 2 SYS 1 PORT 702-025-000

## (undated) DEVICE — SPECIMEN CONTAINER 3OZ W/FORMALIN 59901

## (undated) DEVICE — SNARE CAPIVATOR ROUND COLD SNR BX10 M00561101

## (undated) DEVICE — SOL WATER IRRIG 500ML BOTTLE 2F7113

## (undated) DEVICE — GOWN IMPERVIOUS 2XL BLUE

## (undated) RX ORDER — FENTANYL CITRATE 50 UG/ML
INJECTION, SOLUTION INTRAMUSCULAR; INTRAVENOUS
Status: DISPENSED
Start: 2022-12-09